# Patient Record
Sex: MALE | Race: WHITE | NOT HISPANIC OR LATINO | URBAN - METROPOLITAN AREA
[De-identification: names, ages, dates, MRNs, and addresses within clinical notes are randomized per-mention and may not be internally consistent; named-entity substitution may affect disease eponyms.]

---

## 2017-01-06 ENCOUNTER — OFFICE VISIT (OUTPATIENT)
Dept: INTERNAL MEDICINE | Facility: CLINIC | Age: 67
End: 2017-01-06
Payer: COMMERCIAL

## 2017-01-06 VITALS
TEMPERATURE: 98 F | WEIGHT: 193 LBS | BODY MASS INDEX: 25.58 KG/M2 | HEART RATE: 102 BPM | DIASTOLIC BLOOD PRESSURE: 100 MMHG | HEIGHT: 73 IN | SYSTOLIC BLOOD PRESSURE: 150 MMHG | OXYGEN SATURATION: 97 %

## 2017-01-06 DIAGNOSIS — R55 SYNCOPE, UNSPECIFIED SYNCOPE TYPE: ICD-10-CM

## 2017-01-06 DIAGNOSIS — I10 ESSENTIAL HYPERTENSION: Primary | ICD-10-CM

## 2017-01-06 DIAGNOSIS — I95.1 ORTHOSTATIC HYPOTENSION: ICD-10-CM

## 2017-01-06 PROCEDURE — 99214 OFFICE O/P EST MOD 30 MIN: CPT | Performed by: INTERNAL MEDICINE

## 2017-01-06 RX ORDER — AMLODIPINE BESYLATE 10 MG/1
5 TABLET ORAL 2 TIMES DAILY
COMMUNITY
Start: 2017-01-06 | End: 2017-02-07

## 2017-01-06 NOTE — MR AVS SNAPSHOT
"              After Visit Summary   1/6/2017    Josh Seth    MRN: [id number]           Patient Information     Date Of Birth          1950        Visit Information        Provider Department      1/6/2017 10:40 AM Alan Amezquita MD [facility]        Today's Diagnoses     Essential hypertension    -  1     Noncompliance with medication regimen         Orthostatic hypotension         Syncope, unspecified syncope type            Follow-ups after your visit        Future tests that were ordered for you today     Open Future Orders        Priority Expected Expires Ordered    Echocardiogram Complete Routine  1/6/2018 1/6/2017            Who to contact     If you have questions or need follow up information about today's clinic visit or your schedule please contact [facility] directly at [phone].  Normal or non-critical lab and imaging results will be communicated to you by MyChart, letter or phone within 4 business days after the clinic has received the results. If you do not hear from us within 7 days, please contact the clinic through MyChart or phone. If you have a critical or abnormal lab result, we will notify you by phone as soon as possible.  Submit refill requests through [employer] or call your pharmacy and they will forward the refill request to us. Please allow 3 business days for your refill to be completed.          Additional Information About Your Visit        Mingly[employer] Information     [employer] lets you send messages to your doctor, view your test results, renew your prescriptions, schedule appointments and more. To sign up, go to [email or web address] . Click on \"Log in\" on the left side of the screen, which will take you to the Welcome page. Then click on \"Sign up Now\" on the right side of the page.     You will be asked to enter the access code listed below, as well as some personal information. Please follow the directions to create " "your username and password.     Your access code is: [email or web address]  Expires: 2017 11:30 AM     Your access code will  in 90 days. If you need help or a new code, please call your [facility] or [phone].        Care EveryWhere ID     This is your Care EveryWhere ID. This could be used by other organizations to access your [place] medical records  [id number]        Your Vitals Were     Pulse Temperature Height BMI (Body Mass Index) Pulse Oximetry       102 98  F (36.7  C) (Oral) 6' 1\" (1.854 m) 25.47 kg/m2 97%        Blood Pressure from Last 3 Encounters:   17 150/100   16 142/97   16 158/98    Weight from Last 3 Encounters:   17 193 lb (87.544 kg)   16 195 lb (88.451 kg)   16 206 lb (93.441 kg)                 Today's Medication Changes          These changes are accurate as of: 17 11:30 AM.  If you have any questions, ask your nurse or doctor.               These medicines have changed or have updated prescriptions.        Dose/Directions    amLODIPine 10 MG tablet   Commonly known as:  NORVASC   This may have changed:    - how much to take  - when to take this  - Another medication with the same name was removed. Continue taking this medication, and follow the directions you see here.   Used for:  Essential hypertension   Changed by:  Alan Amezquita MD        Dose:  5 mg   Take 0.5 tablets (5 mg) by mouth 2 times daily   Refills:  0         Stop taking these medicines if you haven't already. Please contact your care team if you have questions.     hydrochlorothiazide 25 MG tablet   Commonly known as:  HYDRODIURIL   Stopped by:  Alan Amezquita MD           lisinopril 10 MG tablet   Commonly known as:  PRINIVIL/ZESTRIL   Stopped by:  Alan Amezquita MD           lisinopril 20 MG tablet   Commonly known as:  PRINIVIL/ZESTRIL   Stopped by:  Alan Amezquita MD                    Primary Care Provider Office Phone # Fax #    Abrahan Bonilla MD " [phone]       [facility] [address]  [facility] [address]        Thank you!     Thank you for choosing [facility]  for your care. Our goal is always to provide you with excellent care. Hearing back from our patients is one way we can continue to improve our services. Please take a few minutes to complete the written survey that you may receive in the mail after your visit with us. Thank you!             Your Updated Medication List - Protect others around you: Learn how to safely use, store and throw away your medicines at www.disposemymeds.org.          This list is accurate as of: 1/6/17 11:30 AM.  Always use your most recent med list.                   Brand Name Dispense Instructions for use    amLODIPine 10 MG tablet    NORVASC     Take 0.5 tablets (5 mg) by mouth 2 times daily       ASPIRIN NOT PRESCRIBED    INTENTIONAL    0    due to hx of pud       nicotine polacrilex 4 MG gum    NICORETTE     Place 4 mg inside cheek as needed for smoking cessation

## 2017-01-06 NOTE — PROGRESS NOTES
"  SUBJECTIVE:                                                    Josh Seth is a 66 year old male who presents to clinic today for the following health issues:    Pt has been seen sporatically over the last few yrs for his BP.  Mostly by his PCP Dr. Bonilla.   Each time he is found to not be taking his BP meds regularly.  Most recently he reports that he has been trying to figure out which meds he should be taking because he has 2 syncopal episodes in the last few months. He describes these as occuring w/getting up /change in position. He feels dizzy, knows he is about to lose consciousness and either does or almost does.  Because of this he has been changing his regimen on his own to see if his meds are causing this.  This incld taking meds every other day, switching the doses. Stopping some altogether.  Most recently he is only taking his amlodipine 5 bid and states most of his home # since have been in the 120/80-90 range.    When seen recently in [facility] - he was taking many QOD , now as above only on the amlodipine.  Labs done at that time showed moderate AST/ALT elev- he admits to \"heavy\" alcohol use earlier this year but now has only 1-2 per day.   Continues to smoke and notes a productive cough over the last 2 weeks with some post nasal drainage that is worse in the AM.      Amount of exercise or physical activity: None    Problems taking medications regularly: No    Medication side effects: lightheadedness when going to stand up  Diet: regular (no restrictions)    Problem list and histories reviewed & adjusted, as indicated.  Additional history: as documented    Problem list, Medication list, Allergies, and Medical/Social/Surgical histories reviewed in [facility] and updated as appropriate.    ROS:  Constitutional, HEENT, cardiovascular, pulmonary, gi and gu systems are negative, except as otherwise noted.    OBJECTIVE:                                                    /100 mmHg  Pulse 102  Temp(Src) " "98  F (36.7  C) (Oral)  Ht 6' 1\" (1.854 m)  Wt 193 lb (87.544 kg)  BMI 25.47 kg/m2  SpO2 97%  Body mass index is 25.47 kg/(m^2).  GENERAL APPEARANCE: alert, no distress and over weight  HENT: nose and mouth without ulcers or lesions  NECK: no adenopathy, no asymmetry, masses, or scars and thyroid normal to palpation  RESP: rhonchi. Diminished BS.    CV: regular rates and rhythm, normal S1 S2, no S3 or S4 and no murmur, click or rub  MS: extremities normal- no gross deformities noted  SKIN: no suspicious lesions or rashes    Diagnostic test results:  none      ASSESSMENT/PLAN:                                                    1. Essential hypertension  Poor control. Labile . i recommended a 24 bp monitor given he is convinced additional medication is not to his advantage.   He even reported at the end of appt that he was unsure if he took his AM med- so rec'd he get his BP rechecked at pharm or here in short term.  D/c alcohol or cut back drastically  Consider B-B for BP due to hx of vasovagal/orthostatic episodes.    - amLODIPine (NORVASC) 10 MG tablet; Take 0.5 tablets (5 mg) by mouth 2 times daily  - Echocardiogram Complete; Future    2. Orthostatic hypotension  3. Syncope, unspecified syncope type  Likely multi-factorial- what role- if any the BP meds play not sure.  Given off most - will continue with present med only , get the echo, recheck BP here after echo. rec'd drastic reduction in alcohol intake.  Consider event monitor if continues  - Echocardiogram Complete; Future    4. Elevated LFTs  Probably elated to alcohol use - but would rec'd repeat and w/u if continued elevations noted    Follow up with Provider - after echo     Alan Amezquita MD  [facility]    "

## 2017-01-06 NOTE — NURSING NOTE
"Chief Complaint   Patient presents with     Urgent Care     Hypertension       Initial /100 mmHg  Pulse 102  Temp(Src) 98  F (36.7  C) (Oral)  Ht 6' 1\" (1.854 m)  Wt 193 lb (87.544 kg)  BMI 25.47 kg/m2  SpO2 97% Estimated body mass index is 25.47 kg/(m^2) as calculated from the following:    Height as of this encounter: 6' 1\" (1.854 m).    Weight as of this encounter: 193 lb (87.544 kg).  BP completed using cuff size: nicolle Maynard CMA       "

## 2017-01-18 ENCOUNTER — TELEPHONE (OUTPATIENT)
Dept: INTERNAL MEDICINE | Facility: CLINIC | Age: 67
End: 2017-01-18

## 2017-01-18 ENCOUNTER — RADIANT APPOINTMENT (OUTPATIENT)
Dept: CARDIOLOGY | Facility: CLINIC | Age: 67
End: 2017-01-18
Attending: INTERNAL MEDICINE
Payer: COMMERCIAL

## 2017-01-18 DIAGNOSIS — I95.1 ORTHOSTATIC HYPOTENSION: ICD-10-CM

## 2017-01-18 DIAGNOSIS — R55 SYNCOPE, UNSPECIFIED SYNCOPE TYPE: ICD-10-CM

## 2017-01-18 DIAGNOSIS — I10 ESSENTIAL HYPERTENSION: ICD-10-CM

## 2017-01-18 PROCEDURE — 93306 TTE W/DOPPLER COMPLETE: CPT | Mod: TC

## 2017-01-18 PROCEDURE — 93306 TTE W/DOPPLER COMPLETE: CPT | Mod: 26 | Performed by: INTERNAL MEDICINE

## 2017-01-18 NOTE — TELEPHONE ENCOUNTER
Pt present at the clinic today with questions regarding his labs from 12/29/16. He was told that his WBC was elevated but at his last visit with MD he said that it was not rechecked. He is requesting WBC recheck and if MD will not order, why he doesn't he need this rechecked?

## 2017-01-18 NOTE — TELEPHONE ENCOUNTER
Patient advised of MD message and will be seeing Dr. Amezquita to follow up on echo results and discuss WBC.  ZACK Dao R.N.

## 2017-01-18 NOTE — TELEPHONE ENCOUNTER
I wouldn't recheck it for that at this time- it so slight an increase.   Don't sweat the little things and miss the bigger issue here- he needs to concentrate on taking his meds on a consistent basis and f/u with Dr. Bonilla or myself

## 2017-02-07 ENCOUNTER — OFFICE VISIT (OUTPATIENT)
Dept: INTERNAL MEDICINE | Facility: CLINIC | Age: 67
End: 2017-02-07
Payer: COMMERCIAL

## 2017-02-07 DIAGNOSIS — I10 ESSENTIAL HYPERTENSION: Primary | ICD-10-CM

## 2017-02-07 DIAGNOSIS — F17.200 TOBACCO USE DISORDER: ICD-10-CM

## 2017-02-07 DIAGNOSIS — R97.20 ELEVATED PROSTATE SPECIFIC ANTIGEN (PSA): ICD-10-CM

## 2017-02-07 DIAGNOSIS — I95.1 ORTHOSTATIC HYPOTENSION: ICD-10-CM

## 2017-02-07 DIAGNOSIS — K75.9 HEPATITIS: ICD-10-CM

## 2017-02-07 DIAGNOSIS — R71.8 ELEVATED MCV: ICD-10-CM

## 2017-02-07 PROBLEM — Z71.89 ADVANCED DIRECTIVES, COUNSELING/DISCUSSION: Status: ACTIVE | Noted: 2017-02-07

## 2017-02-07 LAB
ALBUMIN SERPL-MCNC: 3.9 G/DL (ref 3.4–5)
ALP SERPL-CCNC: 140 U/L (ref 40–150)
ALT SERPL W P-5'-P-CCNC: 193 U/L (ref 0–70)
ANION GAP SERPL CALCULATED.3IONS-SCNC: 9 MMOL/L (ref 3–14)
AST SERPL W P-5'-P-CCNC: 158 U/L (ref 0–45)
BILIRUB SERPL-MCNC: 0.9 MG/DL (ref 0.2–1.3)
BUN SERPL-MCNC: 16 MG/DL (ref 7–30)
CALCIUM SERPL-MCNC: 9.4 MG/DL (ref 8.5–10.1)
CHLORIDE SERPL-SCNC: 99 MMOL/L (ref 94–109)
CO2 SERPL-SCNC: 27 MMOL/L (ref 20–32)
CORTIS SERPL-MCNC: 25.4 UG/DL (ref 4–22)
CREAT SERPL-MCNC: 1.1 MG/DL (ref 0.66–1.25)
ERYTHROCYTE [DISTWIDTH] IN BLOOD BY AUTOMATED COUNT: 15.3 % (ref 10–15)
FOLATE SERPL-MCNC: 3.2 NG/ML
GFR SERPL CREATININE-BSD FRML MDRD: 67 ML/MIN/1.7M2
GLUCOSE SERPL-MCNC: 133 MG/DL (ref 70–99)
HCT VFR BLD AUTO: 38.8 % (ref 40–53)
HGB BLD-MCNC: 14 G/DL (ref 13.3–17.7)
MCH RBC QN AUTO: 37.5 PG (ref 26.5–33)
MCHC RBC AUTO-ENTMCNC: 36.1 G/DL (ref 31.5–36.5)
MCV RBC AUTO: 104 FL (ref 78–100)
PLATELET # BLD AUTO: 171 10E9/L (ref 150–450)
POTASSIUM SERPL-SCNC: 4.9 MMOL/L (ref 3.4–5.3)
PROT SERPL-MCNC: 7.4 G/DL (ref 6.8–8.8)
RBC # BLD AUTO: 3.73 10E12/L (ref 4.4–5.9)
SODIUM SERPL-SCNC: 135 MMOL/L (ref 133–144)
VIT B12 SERPL-MCNC: 942 PG/ML (ref 193–986)
WBC # BLD AUTO: 14.3 10E9/L (ref 4–11)

## 2017-02-07 PROCEDURE — 85027 COMPLETE CBC AUTOMATED: CPT | Performed by: INTERNAL MEDICINE

## 2017-02-07 PROCEDURE — 36415 COLL VENOUS BLD VENIPUNCTURE: CPT | Performed by: INTERNAL MEDICINE

## 2017-02-07 PROCEDURE — 84153 ASSAY OF PSA TOTAL: CPT | Mod: 90 | Performed by: INTERNAL MEDICINE

## 2017-02-07 PROCEDURE — 84165 PROTEIN E-PHORESIS SERUM: CPT | Performed by: INTERNAL MEDICINE

## 2017-02-07 PROCEDURE — 82746 ASSAY OF FOLIC ACID SERUM: CPT | Performed by: INTERNAL MEDICINE

## 2017-02-07 PROCEDURE — 00000402 ZZHCL STATISTIC TOTAL PROTEIN: Performed by: INTERNAL MEDICINE

## 2017-02-07 PROCEDURE — 82533 TOTAL CORTISOL: CPT | Performed by: INTERNAL MEDICINE

## 2017-02-07 PROCEDURE — 99000 SPECIMEN HANDLING OFFICE-LAB: CPT | Performed by: INTERNAL MEDICINE

## 2017-02-07 PROCEDURE — 80053 COMPREHEN METABOLIC PANEL: CPT | Performed by: INTERNAL MEDICINE

## 2017-02-07 PROCEDURE — 99214 OFFICE O/P EST MOD 30 MIN: CPT | Performed by: INTERNAL MEDICINE

## 2017-02-07 PROCEDURE — 84154 ASSAY OF PSA FREE: CPT | Mod: 90 | Performed by: INTERNAL MEDICINE

## 2017-02-07 PROCEDURE — 82607 VITAMIN B-12: CPT | Performed by: INTERNAL MEDICINE

## 2017-02-07 RX ORDER — LISINOPRIL 10 MG/1
TABLET ORAL
Qty: 60 TABLET | Refills: 11 | Status: SHIPPED | OUTPATIENT
Start: 2017-02-07 | End: 2017-07-21 | Stop reason: SINTOL

## 2017-02-07 NOTE — PROGRESS NOTES
SUBJECTIVE:                                                    Josh Seth is a 66 year old male who presents to clinic today for the following health issues:       Transfer of Care  Echo results    Pt's past medical history, family history, habits, medications and allergies were reviewed with the patient today.  See snap shot for  HCM status. Most recent lab results reviewed with pt. Problem list and histories reviewed & adjusted, as indicated.  Additional history as below:    Meeting patient for the first time today. Chart reviewed from visits with previous doctors. History of hypertension previously managed with lisinopril. Chart shows the patient was changed to Norvasc but the patient did not fill this and is currently taking lisinopril 20 mg tablet, one half tablet the morning, one third tablet daily afternoon and one half tablet in the evening. Patient describes symptoms of lightheadedness intermittently here today. Patient though wants a couple weeks ago when blood pressure was low. Denies head trauma or residual musculoskeletal symptoms since then. No loss of consciousness. No seizure activity. Patient is drinking alcohol daily. He describes having 2 glasses of vodka per day though the glasses each contain approximately 4-5 shots worth of vodka. Patient currently smoking. Denies medical difficulty with alcohol use. 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 Patient states staff has been cut dramatically in his apartment and he just having to do more more for his job which is stressful. Patient's mother passed away a year ago. Patient denies suicidal ideation. Admits to some depression symptoms but not interested in medication at this time. Denies others complain about his alcohol use. Denies current chest pain or shortness of breath. No abdominal pain. Review of the chart shows a previous PSA lab  1 iear ago which was elevated  "to 7.0. Patient had been referred to urology according to the chart prior to that lab result but no follow-up has been done. Patient did not see urology at that time. Denies current hematuria or dysuria. Occasional nocturia. Labs from late December from an urgent care visit show hepatitis. No follow-up done     Additional ROS:   Constitutional, HEENT, Cardiovascular, Pulmonary, GI and , Neuro, MSK and Psych review of systems/symptoms are otherwise negative or unchanged from previous, except as noted above.      OBJECTIVE:  /88 mmHg  Pulse 96  Temp(Src) 97.9  F (36.6  C) (Oral)  Wt 187 lb (84.823 kg)  SpO2 98%   Estimated body mass index is 24.68 kg/(m^2) as calculated from the following:    Height as of 1/6/17: 6' 1\" (1.854 m).    Weight as of this encounter: 187 lb (84.823 kg).     Pt has a 30 point drop in SBP with lying to standing position    Eye: PERRL, EOMI  HENT: ear canals and TM's normal and nose and mouth without ulcers or lesions   Neck: no adenopathy. Thyroid normal to palpation. No bruits  Pulm: Lungs clear to auscultation   CV: Regular rates and rhythm  GI: Soft, nontender, Normal active bowel sounds, No hepatosplenomegaly or masses palpable  Ext: Peripheral pulses intact. No edema.  Neuro: Normal strength and tone, sensory exam grossly normal. No asterixis  Rectal: prostate symmetric w/o nodularity, no masses palpated, stool guaiac negative and prostate 1+       Assessment/Plan: (See plan discussion below for further details)  1. Essential hypertension  Patient showing some orthostatic changes as above. See plan discussion below regarding dose adjustment along with reducing risk factors for orthostatic changes.   - lisinopril (PRINIVIL/ZESTRIL) 10 MG tablet; 1 tab twice a day  Dispense: 60 tablet; Refill: 11    2. Orthostatic hypotension  See plan discussion below  - Comprehensive metabolic panel  - CBC with platelets  - Cortisol    3. Hepatitis  - Comprehensive metabolic panel    4. " Elevated prostate specific antigen (PSA)  Needs lab recheck If worsens, will refer to Urology  - PSA, total and free    5. Elevated MCV  See plan discussion below regarding alcohol cessation. Labs as ordered  - Vitamin B12  - Protein electrophoresis  - Folate  - CBC with platelets    6. Tobacco use disorder  See plan discussion below    Plan discussion:  Labs as ordered   Reduce alcohol  to 1 drink for the next 2 days max. Then no alcohol  Change Lisinopril to 20mg tab, 1/2 tab twice a day or 10mg tab, 1 tab twice a day. Skip the  mid afternoon dose  Reduce cigarette use with goal of stopping completely in the next 2 weeks.  May use nicotine gum  Reduce tea and other caffeine consumption with goal of  going caffeine-free in the next 2 weeks  See me in 4 weeks for follow-up         José Manuel Demarco MD  Internal Medicine Department  [facility]

## 2017-02-07 NOTE — PATIENT INSTRUCTIONS
Labs as ordered   Reduce alcohol  To 1 drink for the next 2 days max. Then no alcohol  Change Lisinopril to 20mg tab, 1/2 tab twice a day or 10mg tab, 1 tab twice a day. Skip the  mid afternoon dose  Reduce cigarette use with goal of stopping completely in the next 2 weeks.  May use nicotine gum  Reduce tea and other caffeine consumption with goal of  going caffeine-free in the next 2 weeks  See me in 4 weeks for follow-up  Pt declines vaccinations

## 2017-02-07 NOTE — NURSING NOTE
"Chief Complaint   Patient presents with     Establish Care     Results     echo results       Initial /98 mmHg  Pulse 111  Temp(Src) 97.9  F (36.6  C) (Oral)  Wt 187 lb (84.823 kg)  SpO2 98% Estimated body mass index is 24.68 kg/(m^2) as calculated from the following:    Height as of 1/6/17: 6' 1\" (1.854 m).    Weight as of this encounter: 187 lb (84.823 kg).  Medication Reconciliation: complete    "

## 2017-02-07 NOTE — MR AVS SNAPSHOT
After Visit Summary   2/7/2017    Josh Seth    MRN: [id number]           Patient Information     Date Of Birth          1950        Visit Information        Provider Department      2/7/2017 8:30 AM José Manuel Demarco MD [facility]        Today's Diagnoses     Elevated prostate specific antigen (PSA)    -  1     Need for hepatitis C screening test         Tobacco use disorder         Need for prophylactic vaccination against Streptococcus pneumoniae (pneumococcus)         Orthostatic hypotension         Hepatitis         Essential hypertension         Elevated MCV           Care Instructions    Labs as ordered   Reduce alcohol  To 1 drink for the next 2 days max. Then no alcohol  Change Lisinopril to 20mg tab, 1/2 tab twice a day or 10mg tab, 1 tab twice a day. Skip the  mid afternoon dose  Reduce cigarette use with goal of stopping completely in the next 2 weeks.  May use nicotine gum  Reduce tea and other caffeine consumption with goal of  going caffeine-free in the next 2 weeks  See me in 4 weeks for follow-up  Pt declines vaccinations        Follow-ups after your visit        Who to contact     If you have questions or need follow up information about today's clinic visit or your schedule please contact [facility] directly at [phone].  Normal or non-critical lab and imaging results will be communicated to you by MyChart, letter or phone within 4 business days after the clinic has received the results. If you do not hear from us within 7 days, please contact the clinic through MyChart or phone. If you have a critical or abnormal lab result, we will notify you by phone as soon as possible.  Submit refill requests through [employer] or call your pharmacy and they will forward the refill request to us. Please allow 3 business days for your refill to be completed.          Additional Information About Your Visit        MyChart Information      "[employer] lets you send messages to your doctor, view your test results, renew your prescriptions, schedule appointments and more. To sign up, go to [email or web address] . Click on \"Log in\" on the left side of the screen, which will take you to the Welcome page. Then click on \"Sign up Now\" on the right side of the page.     You will be asked to enter the access code listed below, as well as some personal information. Please follow the directions to create your username and password.     Your access code is: [email or web address]  Expires: 2017 11:30 AM     Your access code will  in 90 days. If you need help or a new code, please call your [place] clinic or [phone].        Care EveryWhere ID     This is your Care EveryWhere ID. This could be used by other organizations to access your [place] medical records  [id number]        Your Vitals Were     Pulse Temperature Pulse Oximetry             111 97.9  F (36.6  C) (Oral) 98%          Blood Pressure from Last 3 Encounters:   17 150/88   17 150/100   16 142/97    Weight from Last 3 Encounters:   17 187 lb (84.823 kg)   17 193 lb (87.544 kg)   16 195 lb (88.451 kg)              We Performed the Following     CBC with platelets     Comprehensive metabolic panel     Cortisol     Folate     Protein electrophoresis     PSA, total and free     Vitamin B12          Today's Medication Changes          These changes are accurate as of: 17  9:30 AM.  If you have any questions, ask your nurse or doctor.               Start taking these medicines.        Dose/Directions    lisinopril 10 MG tablet   Commonly known as:  PRINIVIL/ZESTRIL   Used for:  Essential hypertension   Started by:  José Manuel Demarco MD        1 tab twice a day   Quantity:  60 tablet   Refills:  11         Stop taking these medicines if you haven't already. Please contact your care team if you have questions.     amLODIPine 10 MG tablet   Commonly known as:  NORVASC "   Stopped by:  José Manuel Demarco MD                Where to get your medicines      These medications were sent to [facility] [address] [place], [place] - [address]  [address] [facility] [address]     Phone:  [phone]    - lisinopril 10 MG tablet             Primary Care Provider Office Phone # Fax #    Abrahan Bonilla [phone]       [facility] [address]  [facility] [address]        Thank you!     Thank you for choosing [facility]  for your care. Our goal is always to provide you with excellent care. Hearing back from our patients is one way we can continue to improve our services. Please take a few minutes to complete the written survey that you may receive in the mail after your visit with us. Thank you!             Your Updated Medication List - Protect others around you: Learn how to safely use, store and throw away your medicines at www.disposemymeds.org.          This list is accurate as of: 2/7/17  9:30 AM.  Always use your most recent med list.                   Brand Name Dispense Instructions for use    ASPIRIN NOT PRESCRIBED    INTENTIONAL    0    due to hx of pud       lisinopril 10 MG tablet    PRINIVIL/ZESTRIL    60 tablet    1 tab twice a day       nicotine polacrilex 4 MG gum    NICORETTE     Place 4 mg inside cheek as needed for smoking cessation

## 2017-02-08 VITALS
TEMPERATURE: 97.9 F | SYSTOLIC BLOOD PRESSURE: 150 MMHG | WEIGHT: 187 LBS | DIASTOLIC BLOOD PRESSURE: 88 MMHG | HEART RATE: 96 BPM | OXYGEN SATURATION: 98 % | BODY MASS INDEX: 24.68 KG/M2

## 2017-02-08 LAB
ALBUMIN SERPL ELPH-MCNC: 4.5 G/DL (ref 3.7–5.1)
ALPHA1 GLOB SERPL ELPH-MCNC: 0.3 G/DL (ref 0.2–0.4)
ALPHA2 GLOB SERPL ELPH-MCNC: 0.7 G/DL (ref 0.5–0.9)
B-GLOBULIN SERPL ELPH-MCNC: 0.7 G/DL (ref 0.6–1)
GAMMA GLOB SERPL ELPH-MCNC: 0.9 G/DL (ref 0.7–1.6)
M PROTEIN SERPL ELPH-MCNC: 0.1 G/DL
PROT PATTERN SERPL ELPH-IMP: ABNORMAL
PSA FREE MFR SERPL: 21 %
PSA FREE SERPL-MCNC: 2.5 NG/ML
PSA SERPL-MCNC: 11.7 NG/ML

## 2017-02-15 ENCOUNTER — TELEPHONE (OUTPATIENT)
Dept: INTERNAL MEDICINE | Facility: CLINIC | Age: 67
End: 2017-02-15

## 2017-02-15 DIAGNOSIS — K75.9 HEPATITIS: ICD-10-CM

## 2017-02-15 DIAGNOSIS — D47.2 MGUS (MONOCLONAL GAMMOPATHY OF UNKNOWN SIGNIFICANCE): ICD-10-CM

## 2017-02-15 DIAGNOSIS — R97.20 ELEVATED PROSTATE SPECIFIC ANTIGEN (PSA): Primary | ICD-10-CM

## 2017-02-15 DIAGNOSIS — D72.829 LEUKOCYTOSIS, UNSPECIFIED TYPE: ICD-10-CM

## 2017-02-15 NOTE — TELEPHONE ENCOUNTER
Reason for Call:  Request for results:    Name of test or procedure: Lab     Date of test of procedure: 2/7/17    Location of the test or procedure: [facility]    OK to leave the result message on voice mail or with a family member? YES    Phone number Patient can be reached at:  Home number on file [phone] (home)    Additional comments: patient would like a copy of his lab results sent to him.    Call taken on 2/15/2017 at 4:16 PM by EVANS JAMES

## 2017-02-20 PROBLEM — D47.2 MGUS (MONOCLONAL GAMMOPATHY OF UNKNOWN SIGNIFICANCE): Status: ACTIVE | Noted: 2017-02-20

## 2017-02-21 NOTE — TELEPHONE ENCOUNTER
Spoke with pt and reviewed. PSA worsened  Compared to 1 year ago when checked by Dr Bonilla. Pt did not see Urology then as recommended and ordered. Pt to see [facility], KALE Michel [phone] for eval of PSA and probable prostate. Pt given tele number but will ask my nurse to speak with Urology clinic tomorrow to be sure that they can get pt in to be seen in the next couple weeks. Pt now drinking 1/2 previous ETOH. Encouraged him to reduce to 1/2 for the next 2-3 days and then STOP ALL ETOH use and have NF lab 1 week and see me in 2 week for f/u. Labs ordered. Reviewed other lab results with pt

## 2017-02-21 NOTE — TELEPHONE ENCOUNTER
Referral faxed to [facility] asking that they call  patient and schedule to be seen in the next 1-2 weeks.  If unable to do so, to please call us back.

## 2017-03-03 ENCOUNTER — OFFICE VISIT (OUTPATIENT)
Dept: UROLOGY | Facility: CLINIC | Age: 67
End: 2017-03-03
Payer: COMMERCIAL

## 2017-03-03 VITALS
HEART RATE: 88 BPM | OXYGEN SATURATION: 98 % | SYSTOLIC BLOOD PRESSURE: 160 MMHG | DIASTOLIC BLOOD PRESSURE: 88 MMHG | HEIGHT: 73 IN | WEIGHT: 190 LBS | BODY MASS INDEX: 25.18 KG/M2

## 2017-03-03 DIAGNOSIS — R97.20 ELEVATED PROSTATE SPECIFIC ANTIGEN (PSA): Primary | ICD-10-CM

## 2017-03-03 DIAGNOSIS — R35.0 URINARY FREQUENCY: ICD-10-CM

## 2017-03-03 LAB
ALBUMIN UR-MCNC: ABNORMAL MG/DL
APPEARANCE UR: CLEAR
BILIRUB UR QL STRIP: NEGATIVE
COLOR UR AUTO: YELLOW
GLUCOSE UR STRIP-MCNC: NEGATIVE MG/DL
HGB UR QL STRIP: NEGATIVE
HYALINE CASTS #/AREA URNS LPF: 34 /LPF (ref 0–2)
KETONES UR STRIP-MCNC: NEGATIVE MG/DL
LEUKOCYTE ESTERASE UR QL STRIP: ABNORMAL
MUCOUS THREADS #/AREA URNS LPF: PRESENT /LPF
NITRATE UR QL: NEGATIVE
PH UR STRIP: 5.5 PH (ref 5–7)
PSA SERPL-MCNC: 2.5 NG/ML (ref 0–4)
RBC #/AREA URNS AUTO: 0 /HPF (ref 0–2)
SP GR UR STRIP: 1.02 (ref 1–1.03)
SQUAMOUS #/AREA URNS AUTO: <1 /HPF (ref 0–1)
URN SPEC COLLECT METH UR: ABNORMAL
UROBILINOGEN UR STRIP-ACNC: 1 EU/DL (ref 0.2–1)
WBC #/AREA URNS AUTO: 2 /HPF (ref 0–2)

## 2017-03-03 PROCEDURE — 81001 URINALYSIS AUTO W/SCOPE: CPT | Performed by: UROLOGY

## 2017-03-03 PROCEDURE — 87086 URINE CULTURE/COLONY COUNT: CPT | Performed by: UROLOGY

## 2017-03-03 PROCEDURE — 99214 OFFICE O/P EST MOD 30 MIN: CPT | Performed by: UROLOGY

## 2017-03-03 PROCEDURE — 84153 ASSAY OF PSA TOTAL: CPT | Performed by: UROLOGY

## 2017-03-03 PROCEDURE — 88112 CYTOPATH CELL ENHANCE TECH: CPT | Performed by: UROLOGY

## 2017-03-03 PROCEDURE — 36415 COLL VENOUS BLD VENIPUNCTURE: CPT | Performed by: UROLOGY

## 2017-03-03 RX ORDER — ALFUZOSIN HYDROCHLORIDE 10 MG/1
10 TABLET, EXTENDED RELEASE ORAL DAILY
Qty: 30 TABLET | Refills: 3 | Status: SHIPPED | OUTPATIENT
Start: 2017-03-03

## 2017-03-03 ASSESSMENT — PAIN SCALES - GENERAL: PAINLEVEL: NO PAIN (0)

## 2017-03-03 NOTE — MR AVS SNAPSHOT
After Visit Summary   3/3/2017    Josh Seth    MRN: [id number]           Patient Information     Date Of Birth          1950        Visit Information        Provider Department      3/3/2017 1:00 PM Dolores Ohara MD [facility] [place]        Today's Diagnoses     Elevated prostate specific antigen (PSA)    -  1    Urinary frequency           Follow-ups after your visit        Follow-up notes from your care team     Return in about 6 weeks (around 4/14/2017) for uroflow/PVR .      Your next 10 appointments already scheduled     Apr 13, 2017 11:00 AM CDT   Return Visit with Dolores Ohara MD   [facility] [place])    [address]  [address]  [facility] [address] [phone]              Future tests that were ordered for you today     Open Future Orders        Priority Expected Expires Ordered    UA without Microscopic Routine  3/3/2018 3/3/2017            Who to contact     If you have questions or need follow up information about today's clinic visit or your schedule please contact [facility] directly at [phone].  Normal or non-critical lab and imaging results will be communicated to you by MyChart, letter or phone within 4 business days after the clinic has received the results. If you do not hear from us within 7 days, please contact the clinic through [employer] or phone. If you have a critical or abnormal lab result, we will notify you by phone as soon as possible.  Submit refill requests through [employer] or call your pharmacy and they will forward the refill request to us. Please allow 3 business days for your refill to be completed.          Additional Information About Your Visit        MyChart Information     [employer] lets you send messages to your doctor, view your test results, renew your prescriptions, schedule  "appointments and more. To sign up, go to [email or web address] . Click on \"Log in\" on the left side of the screen, which will take you to the Welcome page. Then click on \"Sign up Now\" on the right side of the page.     You will be asked to enter the access code listed below, as well as some personal information. Please follow the directions to create your username and password.     Your access code is: [email or web address]  Expires: 2017 11:30 AM     Your access code will  in 90 days. If you need help or a new code, please call your [place] clinic or [phone].        Care EveryWhere ID     This is your Care EveryWhere ID. This could be used by other organizations to access your [place] medical records  [id number]        Your Vitals Were     Pulse Height Pulse Oximetry BMI (Body Mass Index)          88 1.854 m (6' 1\") 98% 25.07 kg/m2         Blood Pressure from Last 3 Encounters:   17 160/88   17 150/88   17 (!) 150/100    Weight from Last 3 Encounters:   17 86.2 kg (190 lb)   17 84.8 kg (187 lb)   17 87.5 kg (193 lb)              We Performed the Following     Cytology non gyn [YFH3784]     PSA Diag Urologic Phys     Urine Culture Aerobic Bacterial [[id number]]     Urine Micro Urologic Phys [[id number]]          Today's Medication Changes          These changes are accurate as of: 3/3/17  2:06 PM.  If you have any questions, ask your nurse or doctor.               Start taking these medicines.        Dose/Directions    alfuzosin 10 MG 24 hr tablet   Commonly known as:  UROXATRAL   Used for:  Urinary frequency   Started by:  Dolores Ohara MD        Dose:  10 mg   Take 1 tablet (10 mg) by mouth daily   Quantity:  30 tablet   Refills:  3            Where to get your medicines      These medications were sent to [facility] [address] [place] [id number]     Phone:  [phone]     alfuzosin 10 MG " 24 hr tablet                Primary Care Provider Office Phone # Fax #    José Manuel Demarco [phone]       [facility] [address] [facility] [address]        Thank you!     Thank you for choosing [facility] [place]  for your care. Our goal is always to provide you with excellent care. Hearing back from our patients is one way we can continue to improve our services. Please take a few minutes to complete the written survey that you may receive in the mail after your visit with us. Thank you!             Your Updated Medication List - Protect others around you: Learn how to safely use, store and throw away your medicines at www.disposemymeds.org.          This list is accurate as of: 3/3/17  2:06 PM.  Always use your most recent med list.                   Brand Name Dispense Instructions for use    alfuzosin 10 MG 24 hr tablet    UROXATRAL    30 tablet    Take 1 tablet (10 mg) by mouth daily       lisinopril 10 MG tablet    PRINIVIL/ZESTRIL    60 tablet    1 tab twice a day

## 2017-03-03 NOTE — LETTER
3/3/2017       RE: Josh Seth  [address]  [facility] [address]     Dear Colleague,    Thank you for referring your patient, Josh Seth, to the [facility] [place] at [facility]. Please see a copy of my visit note below.    It was my pleasure to see Josh Seth a 66 year old year old male today. Patient was seen in consultation for elevated PSA.     HPI:   Patient presents for evaluation and management of elevated PSA.   PSA trend:  03/2016: 7.00  02/2017: 11.7    Repeat PSA today was 2.5.     We discussed PSA trend. Also discussed patient's previous microhematuria noted one year ago.       Patient also notes some lower urinary tract symptoms including urgency.   Past Medical History   Diagnosis Date     Acute duodenal ulcer without mention of hemorrhage, perforation, or obstruction      Allergic rhinitis, cause unspecified      MGUS (monoclonal gammopathy of unknown significance) 2/20/2017     Mumps      PATIENT IS NOT SURE     Other and unspecified hyperlipidemia      Prostatitis, unspecified      Sprain of neck      Tobacco use disorder      Unspecified essential hypertension      Unspecified sinusitis (chronic)        Past Surgical History   Procedure Laterality Date     C nonspecific procedure       eye injury (R)  OD op     C nonspecific procedure       nose operation       Family History   Problem Relation Age of Onset     DIABETES Mother      b-1926     Cardiovascular Mother      quintuple bypass       Current Outpatient Prescriptions   Medication Sig Dispense Refill     alfuzosin (UROXATRAL) 10 MG 24 hr tablet Take 1 tablet (10 mg) by mouth daily 30 tablet 3     lisinopril (PRINIVIL/ZESTRIL) 10 MG tablet 1 tab twice a day 60 tablet 11       ALLERGIES: Codeine; Erythromycin; and Sulfa drugs      REVIEW OF SYSTEMS:  Skin: negative  Eyes: negative  Ears/Nose/Throat: negative  Respiratory: No shortness of breath, dyspnea  "on exertion, cough, or hemoptysis  Cardiovascular: negative  Gastrointestinal: negative  Genitourinary: as above  Musculoskeletal: negative  Neurologic: negative  Psychiatric: negative  Hematologic/Lymphatic/Immunologic: negative  Endocrine: negative      GENERAL PHYSICAL EXAM:   Vitals: /88 (BP Location: Left arm, Patient Position: Chair, Cuff Size: Adult Regular)  Pulse 88  Ht 1.854 m (6' 1\")  Wt 86.2 kg (190 lb)  SpO2 98%  BMI 25.07 kg/m2  Body mass index is 25.07 kg/(m^2).  Constitutional: healthy, alert and no distress  Head: Normocephalic  Neck: Neck supple  Cardiovascular: negative  Respiratory: negative  Gastrointestinal: Abdomen soft, non-tender  Musculoskeletal: extremities normal-  Skin: no suspicious lesions or rashes  Neurologic: Gait normal.  Psychiatric: affect normal/bright and mentation appears normal.       EXAM:   Left Flank: negative  Right Flank: negative  Inguinal Area: normal      RECTAL EXAM:   Size: 1_, no nodules   Sphincter tone: normal  Tenderness: Absent  Rectal Mass: Absent  Prostate Nodule: Absent    Small black nevus on right buttock          RADIOLOGY: The following tests were reviewed: Need to complete the following Radiology exams prior to the office appointment:  LABS: The last test results for Needs to complete the necessary tests prior to appointment: were reviewed.     ASSESSMENT: 67 y/o M with fluctuating PSA     PLAN:   Will start trial of alfuzosin   Will repeat PSA in three months   U/a, urine culture, urine cytology today   Follow up in 6-8 weeks       I spent over 30 minutes with the patient.  Over half this time was spent on counseling for elevated PSA and lower urinary tract symptoms.      Again, thank you for allowing me to participate in the care of your patient.      Sincerely,    Dolores Ohara MD      "

## 2017-03-03 NOTE — PROGRESS NOTES
It was my pleasure to see Josh Seth a 66 year old year old male today. Patient was seen in consultation for elevated PSA.     HPI:   Patient presents for evaluation and management of elevated PSA.   PSA trend:  03/2016: 7.00  02/2017: 11.7    Repeat PSA today was 2.5.     We discussed PSA trend. Also discussed patient's previous microhematuria noted one year ago.       Patient also notes some lower urinary tract symptoms including urgency.   Past Medical History   Diagnosis Date     Acute duodenal ulcer without mention of hemorrhage, perforation, or obstruction      Allergic rhinitis, cause unspecified      MGUS (monoclonal gammopathy of unknown significance) 2/20/2017     Mumps      PATIENT IS NOT SURE     Other and unspecified hyperlipidemia      Prostatitis, unspecified      Sprain of neck      Tobacco use disorder      Unspecified essential hypertension      Unspecified sinusitis (chronic)        Past Surgical History   Procedure Laterality Date     C nonspecific procedure       eye injury (R)  OD op     C nonspecific procedure       nose operation       Family History   Problem Relation Age of Onset     DIABETES Mother      b-1926     Cardiovascular Mother      quintuple bypass       Current Outpatient Prescriptions   Medication Sig Dispense Refill     alfuzosin (UROXATRAL) 10 MG 24 hr tablet Take 1 tablet (10 mg) by mouth daily 30 tablet 3     lisinopril (PRINIVIL/ZESTRIL) 10 MG tablet 1 tab twice a day 60 tablet 11       ALLERGIES: Codeine; Erythromycin; and Sulfa drugs      REVIEW OF SYSTEMS:  Skin: negative  Eyes: negative  Ears/Nose/Throat: negative  Respiratory: No shortness of breath, dyspnea on exertion, cough, or hemoptysis  Cardiovascular: negative  Gastrointestinal: negative  Genitourinary: as above  Musculoskeletal: negative  Neurologic: negative  Psychiatric: negative  Hematologic/Lymphatic/Immunologic: negative  Endocrine: negative      GENERAL PHYSICAL EXAM:   Vitals: /88 (BP  "Location: Left arm, Patient Position: Chair, Cuff Size: Adult Regular)  Pulse 88  Ht 1.854 m (6' 1\")  Wt 86.2 kg (190 lb)  SpO2 98%  BMI 25.07 kg/m2  Body mass index is 25.07 kg/(m^2).  Constitutional: healthy, alert and no distress  Head: Normocephalic  Neck: Neck supple  Cardiovascular: negative  Respiratory: negative  Gastrointestinal: Abdomen soft, non-tender  Musculoskeletal: extremities normal-  Skin: no suspicious lesions or rashes  Neurologic: Gait normal.  Psychiatric: affect normal/bright and mentation appears normal.       EXAM:   Left Flank: negative  Right Flank: negative  Inguinal Area: normal      RECTAL EXAM:   Size: 1_, no nodules   Sphincter tone: normal  Tenderness: Absent  Rectal Mass: Absent  Prostate Nodule: Absent    Small black nevus on right buttock          RADIOLOGY: The following tests were reviewed: Need to complete the following Radiology exams prior to the office appointment:  LABS: The last test results for Needs to complete the necessary tests prior to appointment: were reviewed.     ASSESSMENT: 67 y/o M with fluctuating PSA     PLAN:   Will start trial of alfuzosin   Will repeat PSA in three months   U/a, urine culture, urine cytology today   Follow up in 6-8 weeks       I spent over 30 minutes with the patient.  Over half this time was spent on counseling for elevated PSA and lower urinary tract symptoms.    "

## 2017-03-04 LAB
BACTERIA SPEC CULT: NO GROWTH
Lab: NORMAL
MICRO REPORT STATUS: NORMAL
SPECIMEN SOURCE: NORMAL

## 2017-03-06 LAB — COPATH REPORT: NORMAL

## 2017-04-18 DIAGNOSIS — R39.15 URGENCY OF URINATION: Primary | ICD-10-CM

## 2017-04-20 ENCOUNTER — OFFICE VISIT (OUTPATIENT)
Dept: UROLOGY | Facility: CLINIC | Age: 67
End: 2017-04-20
Payer: COMMERCIAL

## 2017-04-20 VITALS
HEART RATE: 86 BPM | DIASTOLIC BLOOD PRESSURE: 70 MMHG | SYSTOLIC BLOOD PRESSURE: 138 MMHG | BODY MASS INDEX: 24.6 KG/M2 | HEIGHT: 73 IN | WEIGHT: 185.6 LBS | OXYGEN SATURATION: 98 %

## 2017-04-20 DIAGNOSIS — R39.9 LOWER URINARY TRACT SYMPTOMS (LUTS): Primary | ICD-10-CM

## 2017-04-20 PROCEDURE — 51798 US URINE CAPACITY MEASURE: CPT | Performed by: UROLOGY

## 2017-04-20 PROCEDURE — 99213 OFFICE O/P EST LOW 20 MIN: CPT | Mod: 25 | Performed by: UROLOGY

## 2017-04-20 ASSESSMENT — PAIN SCALES - GENERAL: PAINLEVEL: NO PAIN (0)

## 2017-04-20 NOTE — PROGRESS NOTES
Office Visit Note      UROLOGIC DIAGNOSES:       CURRENT INTERVENTIONS:       HISTORY:     Patient presents for evaluation and management of elevated PSA.   PSA trend:  03/2016: 7.00  02/2017: 11.7  03/2017 2.5    Patient with urgency and slowed stream. Noted improvement in stream one evening after several beers. Has noted several similar episodes. Some frequency noted during these episodes however.       Has not started alfuzosin yet. Therefore difficult to assess symptoms.            PAST MEDICAL HISTORY:   Past Medical History:   Diagnosis Date     Acute duodenal ulcer without mention of hemorrhage, perforation, or obstruction      Allergic rhinitis, cause unspecified      MGUS (monoclonal gammopathy of unknown significance) 2/20/2017     Mumps     PATIENT IS NOT SURE     Other and unspecified hyperlipidemia      Prostatitis, unspecified      Sprain of neck      Tobacco use disorder      Unspecified essential hypertension      Unspecified sinusitis (chronic)        PAST SURGICAL HISTORY:   Past Surgical History:   Procedure Laterality Date     C NONSPECIFIC PROCEDURE      eye injury (R)  OD op     C NONSPECIFIC PROCEDURE      nose operation       FAMILY HISTORY:   Family History   Problem Relation Age of Onset     DIABETES Mother      b-1926     Cardiovascular Mother      quintuple bypass       SOCIAL HISTORY:   Social History   Substance Use Topics     Smoking status: Current Every Day Smoker     Packs/day: 0.50     Years: 35.00     Types: Cigarettes     Smokeless tobacco: Never Used      Comment: 2-3 cigarettes daily     Alcohol use 1.2 oz/week     2 Standard drinks or equivalent per week      Comment: 2 glasses daily       Current Outpatient Prescriptions   Medication     lisinopril (PRINIVIL/ZESTRIL) 10 MG tablet     alfuzosin (UROXATRAL) 10 MG 24 hr tablet     No current facility-administered medications for this visit.          PHYSICAL EXAM:    BP (!) 160/100 (BP Location: Left arm, Patient Position:  "Chair, Cuff Size: Adult Regular)  Pulse 86  Ht 1.854 m (6' 1\")  Wt 84.2 kg (185 lb 9.6 oz)  SpO2 98%  BMI 24.49 kg/m2    HEENT: Normocephalic and atraumatic   Cardiac: Not done  Back/Flank: Not done  CNS/PNS: Not done  Respiratory: Normal non-labored breathing  Abdomen: Soft nontender and nondistended  Peripheral Vascular: Not done  Mental Status: Not done    Penis: Not done  Scrotal Skin: Not done  Testicles: Not done  Epididymis: Not done  Digital Rectal Exam:     Cystoscopy: Not done    Imaging: None    Urinalysis: UA RESULTS:  Recent Labs   Lab Test  03/03/17   1408  03/03/17   1401   COLOR  Yellow   --    APPEARANCE  Clear   --    URINEGLC  Negative   --    URINEBILI  Negative   --    URINEKETONE  Negative   --    SG  1.020   --    UBLD  Negative   --    URINEPH  5.5   --    PROTEIN  Trace*   --    UROBILINOGEN  1.0   --    NITRITE  Negative   --    LEUKEST  Trace*   --    RBCU   --   0   WBCU   --   2       PSA:     Post Void Residual:     Other labs: None today      IMPRESSION:  65 y/o M with lower urinary tract symptoms and fluctuating PSA     PLAN:  Start alfuzosin   Repeat PSA in 06/2017  Follow up in 06/2017    Total Time: 15 minutes                                    Total in Consultation: greater than 50%     Discussed lower urinary tract symptoms, PSA           "

## 2017-04-20 NOTE — LETTER
4/20/2017       RE: Josh Seht  [address]  [facility] [address]     Dear Colleague,    Thank you for referring your patient, Josh Seth, to the [facility] [place] at [facility]. Please see a copy of my visit note below.    Office Visit Note    UROLOGIC DIAGNOSES:     CURRENT INTERVENTIONS:     HISTORY:     Patient presents for evaluation and management of elevated PSA.   PSA trend:  03/2016: 7.00  02/2017: 11.7  03/2017 2.5    Patient with urgency and slowed stream. Noted improvement in stream one evening after several beers. Has noted several similar episodes. Some frequency noted during these episodes however.     Has not started alfuzosin yet. Therefore difficult to assess symptoms.     PAST MEDICAL HISTORY:   Past Medical History:   Diagnosis Date     Acute duodenal ulcer without mention of hemorrhage, perforation, or obstruction      Allergic rhinitis, cause unspecified      MGUS (monoclonal gammopathy of unknown significance) 2/20/2017     Mumps     PATIENT IS NOT SURE     Other and unspecified hyperlipidemia      Prostatitis, unspecified      Sprain of neck      Tobacco use disorder      Unspecified essential hypertension      Unspecified sinusitis (chronic)        PAST SURGICAL HISTORY:   Past Surgical History:   Procedure Laterality Date     C NONSPECIFIC PROCEDURE      eye injury (R)  OD op     C NONSPECIFIC PROCEDURE      nose operation       FAMILY HISTORY:   Family History   Problem Relation Age of Onset     DIABETES Mother      b-1926     Cardiovascular Mother      quintuple bypass       SOCIAL HISTORY:   Social History   Substance Use Topics     Smoking status: Current Every Day Smoker     Packs/day: 0.50     Years: 35.00     Types: Cigarettes     Smokeless tobacco: Never Used      Comment: 2-3 cigarettes daily     Alcohol use 1.2 oz/week     2 Standard drinks or equivalent per week      Comment: 2 glasses daily  "      Current Outpatient Prescriptions   Medication     lisinopril (PRINIVIL/ZESTRIL) 10 MG tablet     alfuzosin (UROXATRAL) 10 MG 24 hr tablet     No current facility-administered medications for this visit.          PHYSICAL EXAM:    BP (!) 160/100 (BP Location: Left arm, Patient Position: Chair, Cuff Size: Adult Regular)  Pulse 86  Ht 1.854 m (6' 1\")  Wt 84.2 kg (185 lb 9.6 oz)  SpO2 98%  BMI 24.49 kg/m2    HEENT: Normocephalic and atraumatic   Cardiac: Not done  Back/Flank: Not done  CNS/PNS: Not done  Respiratory: Normal non-labored breathing  Abdomen: Soft nontender and nondistended  Peripheral Vascular: Not done  Mental Status: Not done    Penis: Not done  Scrotal Skin: Not done  Testicles: Not done  Epididymis: Not done  Digital Rectal Exam:     Cystoscopy: Not done    Imaging: None    Urinalysis: UA RESULTS:  Recent Labs   Lab Test  03/03/17   1408  03/03/17   1401   COLOR  Yellow   --    APPEARANCE  Clear   --    URINEGLC  Negative   --    URINEBILI  Negative   --    URINEKETONE  Negative   --    SG  1.020   --    UBLD  Negative   --    URINEPH  5.5   --    PROTEIN  Trace*   --    UROBILINOGEN  1.0   --    NITRITE  Negative   --    LEUKEST  Trace*   --    RBCU   --   0   WBCU   --   2       PSA:     Post Void Residual:     Other labs: None today      IMPRESSION:  67 y/o M with lower urinary tract symptoms and fluctuating PSA     PLAN:  Start alfuzosin   Repeat PSA in 06/2017  Follow up in 06/2017    Total Time: 15 minutes                                    Total in Consultation: greater than 50%     Discussed lower urinary tract symptoms, PSA     Again, thank you for allowing me to participate in the care of your patient.      Sincerely,    Dolores Ohara MD      "

## 2017-04-20 NOTE — MR AVS SNAPSHOT
"              After Visit Summary   2017    Josh Seth    MRN: [id number]           Patient Information     Date Of Birth          1950        Visit Information        Provider Department      2017 11:00 AM Dolores Ohara MD [facility] [place]        Today's Diagnoses     Lower urinary tract symptoms (LUTS)    -  1       Follow-ups after your visit        Who to contact     If you have questions or need follow up information about today's clinic visit or your schedule please contact [facility] [place] directly at [phone].  Normal or non-critical lab and imaging results will be communicated to you by [employer], letter or phone within 4 business days after the clinic has received the results. If you do not hear from us within 7 days, please contact the clinic through [employer] or phone. If you have a critical or abnormal lab result, we will notify you by phone as soon as possible.  Submit refill requests through [employer] or call your pharmacy and they will forward the refill request to us. Please allow 3 business days for your refill to be completed.          Additional Information About Your Visit        MyChart Information     [employer] lets you send messages to your doctor, view your test results, renew your prescriptions, schedule appointments and more. To sign up, go to [email or web address] . Click on \"Log in\" on the left side of the screen, which will take you to the Welcome page. Then click on \"Sign up Now\" on the right side of the page.     You will be asked to enter the access code listed below, as well as some personal information. Please follow the directions to create your username and password.     Your access code is: [email or web address]  Expires: 2017  5:58 PM     Your access code will  in 90 days. If you need help or a new code, please call your [place] clinic or [phone].        Care EveryWhere " "ID     This is your Care EveryWhere ID. This could be used by other organizations to access your [place] medical records  [id number]        Your Vitals Were     Pulse Height Pulse Oximetry BMI (Body Mass Index)          86 1.854 m (6' 1\") 98% 24.49 kg/m2         Blood Pressure from Last 3 Encounters:   04/20/17 138/70   03/03/17 160/88   02/07/17 150/88    Weight from Last 3 Encounters:   04/20/17 84.2 kg (185 lb 9.6 oz)   03/03/17 86.2 kg (190 lb)   02/07/17 84.8 kg (187 lb)              We Performed the Following     MEASURE POST-VOID RESIDUAL URINE/BLADDER CAPACITY, US NON-IMAGING ([id number])        Primary Care Provider    None Specified       No primary provider on file.        Thank you!     Thank you for choosing [facility] [place]  for your care. Our goal is always to provide you with excellent care. Hearing back from our patients is one way we can continue to improve our services. Please take a few minutes to complete the written survey that you may receive in the mail after your visit with us. Thank you!             Your Updated Medication List - Protect others around you: Learn how to safely use, store and throw away your medicines at www.disposemymeds.org.          This list is accurate as of: 4/20/17  5:58 PM.  Always use your most recent med list.                   Brand Name Dispense Instructions for use    alfuzosin 10 MG 24 hr tablet    UROXATRAL    30 tablet    Take 1 tablet (10 mg) by mouth daily       lisinopril 10 MG tablet    PRINIVIL/ZESTRIL    60 tablet    1 tab twice a day         "

## 2017-06-27 ENCOUNTER — OFFICE VISIT (OUTPATIENT)
Dept: URGENT CARE | Facility: URGENT CARE | Age: 67
End: 2017-06-27
Payer: COMMERCIAL

## 2017-06-27 VITALS
WEIGHT: 174 LBS | HEART RATE: 116 BPM | BODY MASS INDEX: 22.96 KG/M2 | TEMPERATURE: 98 F | DIASTOLIC BLOOD PRESSURE: 100 MMHG | SYSTOLIC BLOOD PRESSURE: 160 MMHG | OXYGEN SATURATION: 98 %

## 2017-06-27 DIAGNOSIS — I10 ESSENTIAL HYPERTENSION: ICD-10-CM

## 2017-06-27 DIAGNOSIS — R71.8 ELEVATED MCV: ICD-10-CM

## 2017-06-27 DIAGNOSIS — K75.9 HEPATITIS: ICD-10-CM

## 2017-06-27 DIAGNOSIS — R26.81 GENERAL UNSTEADINESS: Primary | ICD-10-CM

## 2017-06-27 LAB
ALBUMIN SERPL-MCNC: 3.7 G/DL (ref 3.4–5)
ALP SERPL-CCNC: 155 U/L (ref 40–150)
ALT SERPL W P-5'-P-CCNC: 145 U/L (ref 0–70)
AMYLASE SERPL-CCNC: 46 U/L (ref 30–110)
ANION GAP SERPL CALCULATED.3IONS-SCNC: 8 MMOL/L (ref 3–14)
AST SERPL W P-5'-P-CCNC: 100 U/L (ref 0–45)
BASOPHILS # BLD AUTO: 0.1 10E9/L (ref 0–0.2)
BASOPHILS NFR BLD AUTO: 0.5 %
BILIRUB SERPL-MCNC: 0.7 MG/DL (ref 0.2–1.3)
BUN SERPL-MCNC: 6 MG/DL (ref 7–30)
CALCIUM SERPL-MCNC: 9.9 MG/DL (ref 8.5–10.1)
CHLORIDE SERPL-SCNC: 107 MMOL/L (ref 94–109)
CO2 SERPL-SCNC: 26 MMOL/L (ref 20–32)
CREAT SERPL-MCNC: 0.95 MG/DL (ref 0.66–1.25)
DIFFERENTIAL METHOD BLD: ABNORMAL
EOSINOPHIL # BLD AUTO: 0 10E9/L (ref 0–0.7)
EOSINOPHIL NFR BLD AUTO: 0.1 %
ERYTHROCYTE [DISTWIDTH] IN BLOOD BY AUTOMATED COUNT: 15.1 % (ref 10–15)
GFR SERPL CREATININE-BSD FRML MDRD: 80 ML/MIN/1.7M2
GLUCOSE SERPL-MCNC: 127 MG/DL (ref 70–99)
HCT VFR BLD AUTO: 42.4 % (ref 40–53)
HGB BLD-MCNC: 15 G/DL (ref 13.3–17.7)
LIPASE SERPL-CCNC: 138 U/L (ref 73–393)
LYMPHOCYTES # BLD AUTO: 0.8 10E9/L (ref 0.8–5.3)
LYMPHOCYTES NFR BLD AUTO: 7.7 %
MCH RBC QN AUTO: 36.1 PG (ref 26.5–33)
MCHC RBC AUTO-ENTMCNC: 35.4 G/DL (ref 31.5–36.5)
MCV RBC AUTO: 102 FL (ref 78–100)
MONOCYTES # BLD AUTO: 0.7 10E9/L (ref 0–1.3)
MONOCYTES NFR BLD AUTO: 6.1 %
NEUTROPHILS # BLD AUTO: 9.1 10E9/L (ref 1.6–8.3)
NEUTROPHILS NFR BLD AUTO: 85.6 %
PLATELET # BLD AUTO: 164 10E9/L (ref 150–450)
POTASSIUM SERPL-SCNC: 4.5 MMOL/L (ref 3.4–5.3)
PROT SERPL-MCNC: 7.3 G/DL (ref 6.8–8.8)
RBC # BLD AUTO: 4.15 10E12/L (ref 4.4–5.9)
SODIUM SERPL-SCNC: 141 MMOL/L (ref 133–144)
WBC # BLD AUTO: 10.7 10E9/L (ref 4–11)

## 2017-06-27 PROCEDURE — 99214 OFFICE O/P EST MOD 30 MIN: CPT | Performed by: PHYSICIAN ASSISTANT

## 2017-06-27 PROCEDURE — 85025 COMPLETE CBC W/AUTO DIFF WBC: CPT | Performed by: PHYSICIAN ASSISTANT

## 2017-06-27 PROCEDURE — 82150 ASSAY OF AMYLASE: CPT | Performed by: PHYSICIAN ASSISTANT

## 2017-06-27 PROCEDURE — 36415 COLL VENOUS BLD VENIPUNCTURE: CPT | Performed by: PHYSICIAN ASSISTANT

## 2017-06-27 PROCEDURE — 80053 COMPREHEN METABOLIC PANEL: CPT | Performed by: PHYSICIAN ASSISTANT

## 2017-06-27 PROCEDURE — 83690 ASSAY OF LIPASE: CPT | Performed by: PHYSICIAN ASSISTANT

## 2017-06-27 NOTE — PROGRESS NOTES
"SUBJECTIVE:   Josh Seth is a 66 year old male presenting with a chief complaint of feeling unsteady for \"months\".  Stopped taking Lisinopril \"because it is destroying my body\" about 2 months ago, after reading the side effect profile.     [facility].  He started him on amlopidipine, but he stopped taking it about 2 weeks ago.     He states that his BP at home this morning was 128/75.    He is here today because he is \"tired of fainting at work\", which last happened 6 months ago.  He states that his BP at that time was 70/40.    He states that he has 2 vodka drinks every day at 3pm upon arriving home from work and sleeps until 9pm, then he works at home overnight.  He smokes 1 pack a day, down from 2 packs per day about a year ago.  He states that he started smoking at age 13.     He states that he has not eaten in 2 days.  Vomited yesterday 3-4 times.  No diarrhea.        Past Medical History:   Diagnosis Date     Acute duodenal ulcer without mention of hemorrhage, perforation, or obstruction      Allergic rhinitis, cause unspecified      MGUS (monoclonal gammopathy of unknown significance) 2/20/2017     Mumps     PATIENT IS NOT SURE     Other and unspecified hyperlipidemia      Prostatitis, unspecified      Sprain of neck      Tobacco use disorder      Unspecified essential hypertension      Unspecified sinusitis (chronic)      Patient Active Problem List   Diagnosis     Pes planus     Allergic rhinitis     Essential hypertension     Tobacco abuse     Hyperlipidemia with target LDL less than 130     Adrenal adenoma     Diverticulosis of large intestine     Advanced directives, counseling/discussion     MGUS (monoclonal gammopathy of unknown significance)     Social History   Substance Use Topics     Smoking status: Current Every Day Smoker     Packs/day: 0.50     Years: 35.00     Types: Cigarettes     Smokeless tobacco: Never Used      Comment: 2-3 cigarettes " daily     Alcohol use 1.2 oz/week     2 Standard drinks or equivalent per week      Comment: 2 glasses daily       ROS:  CONSTITUTIONAL:NEGATIVE for fever, chills, change in weight  INTEGUMENTARY/SKIN: NEGATIVE for worrisome rashes, moles or lesions  EYES: NEGATIVE for vision changes or irritation  ENT/MOUTH: negative  RESP:negative  CV: NEGATIVE for chest pain, palpitations or peripheral edema  GI: NEGATIVE for nausea, abdominal pain, heartburn, or change in bowel habits  MUSCULOSKELETAL: feels unstable.    OBJECTIVE  :BP (!) 170/110 (BP Location: Right arm, Patient Position: Chair, Cuff Size: Adult Regular)  Pulse 116  Temp 98  F (36.7  C) (Oral)  Wt 174 lb (78.9 kg)  SpO2 98%  BMI 22.96 kg/m2  GENERAL APPEARANCE: healthy, alert and no distress  EYES: EOMI,  PERRL, conjunctiva clear  HENT: ear canals and TM's normal.  Nose and mouth without ulcers, erythema or lesions  NECK: supple, nontender, no lymphadenopathy  RESP: lungs clear to auscultation - no rales, rhonchi or wheezes  CV: regular rates and rhythm, normal S1 S2, no murmur noted  ABDOMEN:  soft, nontender, no HSM or masses and bowel sounds normal  NEURO: Normal strength and tone, sensory exam grossly normal,  normal speech and mentation  SKIN: no suspicious lesions or rashes    Recheck of /100    (R26.81) General unsteadiness  (primary encounter diagnosis)  Comment:   Plan: CBC with platelets and differential,         Comprehensive metabolic panel (BMP + Alb, Alk         Phos, ALT, AST, Total. Bili, TP), Lipase,         Amylase            (I10) Essential hypertension  Comment:   Plan: patient has Norvasc 5mg at home.  He will take as previously directed, BID.     (K75.9) Hepatitis  Comment:   Plan: follow up with PCP fore re-check of labs within 3-4 weeks.  Appointment made for patient to establish care with Dr. Olivas 7/21/17.    (R71.8) Elevated MCV  Comment:   Plan: discussed decreasing alcohol intake      Patient expresses understanding and  agreement with the assessment and plan as above.

## 2017-06-27 NOTE — NURSING NOTE
"Chief Complaint   Patient presents with     Dizziness     states feel off balance. Has discontinued his BP meds recently. Denies any headaches.       Initial BP (!) 170/110 (BP Location: Right arm, Patient Position: Chair, Cuff Size: Adult Regular)  Pulse 116  Temp 98  F (36.7  C) (Oral)  Wt 174 lb (78.9 kg)  SpO2 98%  BMI 22.96 kg/m2 Estimated body mass index is 22.96 kg/(m^2) as calculated from the following:    Height as of 4/20/17: 6' 1\" (1.854 m).    Weight as of this encounter: 174 lb (78.9 kg).  Medication Reconciliation: complete   Breana SCOTT    "

## 2017-06-27 NOTE — MR AVS SNAPSHOT
After Visit Summary   6/27/2017    Josh Seth    MRN: [id number]           Patient Information     Date Of Birth          1950        Visit Information        Provider Department      6/27/2017 12:55 PM Lakshmi Snyder PA-C [facility]        Today's Diagnoses     General unsteadiness    -  1    Essential hypertension        Hepatitis        Elevated MCV          Care Instructions      (R26.81) General unsteadiness  (primary encounter diagnosis)  Comment:   Plan: CBC with platelets and differential,         Comprehensive metabolic panel (BMP + Alb, Alk         Phos, ALT, AST, Total. Bili, TP), Lipase,         Amylase  Follow up with primary clinic            (I10) Essential hypertension  Comment:   Plan: patient has Norvasc 5mg at home.  He will take as previously directed, 1 tablet twice a day   Recheck of /100  Follow up with primary clinic within 3-4 weeks.     (K75.9) Hepatitis  Comment:   Plan: follow up with PCP fore re-check of labs within 3-4 weeks.  Appointment made for patient to establish care with Dr. Olivas 7/21/17.    (R71.8) Elevated MCV  Comment:   Plan: discussed decreasing alcohol intake    To ED should symptoms worsen in any way.          Follow-ups after your visit        Your next 10 appointments already scheduled     Jul 21, 2017  9:15 AM CDT   Office Visit with Kira [facility]ivas MD   [facility]    [address] [address] [phone]           Bring a current list of meds and any records pertaining to this visit.  For Physicals, please bring immunization records and any forms needing to be filled out.  Please arrive 10 minutes early to complete paperwork.              Who to contact     If you have questions or need follow up information about today's clinic visit or your schedule please contact [facility]  "directly at [phone].  Normal or non-critical lab and imaging results will be communicated to you by "[employer]hart, letter or phone within 4 business days after the clinic has received the results. If you do not hear from us within 7 days, please contact the clinic through "[employer]hart or phone. If you have a critical or abnormal lab result, we will notify you by phone as soon as possible.  Submit refill requests through [employer] or call your pharmacy and they will forward the refill request to us. Please allow 3 business days for your refill to be completed.          Additional Information About Your Visit        "[employer] Information     [employer] lets you send messages to your doctor, view your test results, renew your prescriptions, schedule appointments and more. To sign up, go to [email or web address] . Click on \"Log in\" on the left side of the screen, which will take you to the Welcome page. Then click on \"Sign up Now\" on the right side of the page.     You will be asked to enter the access code listed below, as well as some personal information. Please follow the directions to create your username and password.     Your access code is: [email or web address]  Expires: 2017  5:58 PM     Your access code will  in 90 days. If you need help or a new code, please call your [facility] clinic or [phone].        Care EveryWhere ID     This is your Care EveryWhere ID. This could be used by other organizations to access your [facility] medical records  [id number]        Your Vitals Were     Pulse Temperature Pulse Oximetry BMI (Body Mass Index)          116 98  F (36.7  C) (Oral) 98% 22.96 kg/m2         Blood Pressure from Last 3 Encounters:   17 (!) 160/100   17 138/70   17 160/88    Weight from Last 3 Encounters:   17 174 lb (78.9 kg)   17 185 lb 9.6 oz (84.2 kg)   17 190 lb (86.2 kg)              We Performed the Following     Amylase     CBC with platelets and differential     " Comprehensive metabolic panel (BMP + Alb, Alk Phos, ALT, AST, Total. Bili, TP)     Lipase        Primary Care Provider    None Specified       No primary provider on file.        Equal Access to Services     GERARDO LOMAX : Hadii aad ku hadhongstephania Patmaren, you nilsonalyssa, jaciel kajuan sanches, elsie oracioin hayaan thierrydonavon merida lapepitomarti lona. So [facility] [place] [phone].    ATENCIÓN: Si habla español, tiene a em disposición servicios gratuitos de asistencia lingüística. Llame al [phone].    We comply with applicable federal civil rights laws and [place] laws. We do not discriminate on the basis of race, color, national origin, age, disability sex, sexual orientation or gender identity.            Thank you!     Thank you for choosing [facility]  for your care. Our goal is always to provide you with excellent care. Hearing back from our patients is one way we can continue to improve our services. Please take a few minutes to complete the written survey that you may receive in the mail after your visit with us. Thank you!             Your Updated Medication List - Protect others around you: Learn how to safely use, store and throw away your medicines at www.disposemymeds.org.          This list is accurate as of: 6/27/17  3:15 PM.  Always use your most recent med list.                   Brand Name Dispense Instructions for use Diagnosis    alfuzosin 10 MG 24 hr tablet    UROXATRAL    30 tablet    Take 1 tablet (10 mg) by mouth daily    Urinary frequency       lisinopril 10 MG tablet    PRINIVIL/ZESTRIL    60 tablet    1 tab twice a day    Essential hypertension

## 2017-06-27 NOTE — PATIENT INSTRUCTIONS
(R26.81) General unsteadiness  (primary encounter diagnosis)  Comment:   Plan: CBC with platelets and differential,         Comprehensive metabolic panel (BMP + Alb, Alk         Phos, ALT, AST, Total. Bili, TP), Lipase,         Amylase  Follow up with primary clinic            (I10) Essential hypertension  Comment:   Plan: patient has Norvasc 5mg at home.  He will take as previously directed, 1 tablet twice a day   Recheck of /100  Follow up with primary clinic within 3-4 weeks.     (K75.9) Hepatitis  Comment:   Plan: follow up with PCP fore re-check of labs within 3-4 weeks.  Appointment made for patient to establish care with Dr. Olivas 7/21/17.    (R71.8) Elevated MCV  Comment:   Plan: discussed decreasing alcohol intake    To ED should symptoms worsen in any way.

## 2017-06-27 NOTE — LETTER
[facility]  [address] [address]  [phone]      June 27, 2017    RE:  Josh Seth                                                                                                                                                       [address] [facility] [address]            To whom it may concern:    Johs Seth was see in clinic today.        Sincerely,        Lakshmi Chan [facility]

## 2017-07-11 ENCOUNTER — HOSPITAL ENCOUNTER (EMERGENCY)
Facility: CLINIC | Age: 67
Discharge: HOME | End: 2017-07-11
Attending: EMERGENCY MEDICINE | Admitting: EMERGENCY MEDICINE
Payer: COMMERCIAL

## 2017-07-11 VITALS
RESPIRATION RATE: 16 BRPM | OXYGEN SATURATION: 100 % | WEIGHT: 174 LBS | BODY MASS INDEX: 23.06 KG/M2 | HEART RATE: 95 BPM | SYSTOLIC BLOOD PRESSURE: 146 MMHG | TEMPERATURE: 98.7 F | HEIGHT: 73 IN | DIASTOLIC BLOOD PRESSURE: 102 MMHG

## 2017-07-11 DIAGNOSIS — R53.1 WEAKNESS GENERALIZED: ICD-10-CM

## 2017-07-11 DIAGNOSIS — E86.0 DEHYDRATION: ICD-10-CM

## 2017-07-11 LAB
ALBUMIN SERPL-MCNC: 3.2 G/DL (ref 3.4–5)
ALP SERPL-CCNC: 143 U/L (ref 40–150)
ALT SERPL W P-5'-P-CCNC: 85 U/L (ref 0–70)
ANION GAP SERPL CALCULATED.3IONS-SCNC: 10 MMOL/L (ref 3–14)
AST SERPL W P-5'-P-CCNC: 97 U/L (ref 0–45)
BASOPHILS # BLD AUTO: 0 10E9/L (ref 0–0.2)
BASOPHILS NFR BLD AUTO: 0.6 %
BILIRUB SERPL-MCNC: 0.4 MG/DL (ref 0.2–1.3)
BUN SERPL-MCNC: 8 MG/DL (ref 7–30)
CALCIUM SERPL-MCNC: 9.1 MG/DL (ref 8.5–10.1)
CHLORIDE SERPL-SCNC: 108 MMOL/L (ref 94–109)
CO2 SERPL-SCNC: 24 MMOL/L (ref 20–32)
CREAT SERPL-MCNC: 0.83 MG/DL (ref 0.66–1.25)
DIFFERENTIAL METHOD BLD: ABNORMAL
EOSINOPHIL # BLD AUTO: 0 10E9/L (ref 0–0.7)
EOSINOPHIL NFR BLD AUTO: 0.3 %
ERYTHROCYTE [DISTWIDTH] IN BLOOD BY AUTOMATED COUNT: 15.3 % (ref 10–15)
GFR SERPL CREATININE-BSD FRML MDRD: ABNORMAL ML/MIN/1.7M2
GLUCOSE SERPL-MCNC: 134 MG/DL (ref 70–99)
HCT VFR BLD AUTO: 38.6 % (ref 40–53)
HGB BLD-MCNC: 13.6 G/DL (ref 13.3–17.7)
IMM GRANULOCYTES # BLD: 0 10E9/L (ref 0–0.4)
IMM GRANULOCYTES NFR BLD: 0.1 %
LIPASE SERPL-CCNC: 178 U/L (ref 73–393)
LYMPHOCYTES # BLD AUTO: 1 10E9/L (ref 0.8–5.3)
LYMPHOCYTES NFR BLD AUTO: 14.4 %
MCH RBC QN AUTO: 35.8 PG (ref 26.5–33)
MCHC RBC AUTO-ENTMCNC: 35.2 G/DL (ref 31.5–36.5)
MCV RBC AUTO: 102 FL (ref 78–100)
MONOCYTES # BLD AUTO: 0.6 10E9/L (ref 0–1.3)
MONOCYTES NFR BLD AUTO: 8.5 %
NEUTROPHILS # BLD AUTO: 5.4 10E9/L (ref 1.6–8.3)
NEUTROPHILS NFR BLD AUTO: 76.1 %
NRBC # BLD AUTO: 0 10*3/UL
NRBC BLD AUTO-RTO: 0 /100
PLATELET # BLD AUTO: 173 10E9/L (ref 150–450)
POTASSIUM SERPL-SCNC: 3.6 MMOL/L (ref 3.4–5.3)
PROT SERPL-MCNC: 6.4 G/DL (ref 6.8–8.8)
RBC # BLD AUTO: 3.8 10E12/L (ref 4.4–5.9)
SODIUM SERPL-SCNC: 142 MMOL/L (ref 133–144)
TROPONIN I SERPL-MCNC: NORMAL UG/L (ref 0–0.04)
WBC # BLD AUTO: 7.1 10E9/L (ref 4–11)

## 2017-07-11 PROCEDURE — 25000128 H RX IP 250 OP 636: Performed by: EMERGENCY MEDICINE

## 2017-07-11 PROCEDURE — 96360 HYDRATION IV INFUSION INIT: CPT

## 2017-07-11 PROCEDURE — 25000132 ZZH RX MED GY IP 250 OP 250 PS 637: Performed by: EMERGENCY MEDICINE

## 2017-07-11 PROCEDURE — 85025 COMPLETE CBC W/AUTO DIFF WBC: CPT | Performed by: EMERGENCY MEDICINE

## 2017-07-11 PROCEDURE — 96361 HYDRATE IV INFUSION ADD-ON: CPT

## 2017-07-11 PROCEDURE — 99284 EMERGENCY DEPT VISIT MOD MDM: CPT | Mod: 25

## 2017-07-11 PROCEDURE — 83690 ASSAY OF LIPASE: CPT | Performed by: EMERGENCY MEDICINE

## 2017-07-11 PROCEDURE — 84484 ASSAY OF TROPONIN QUANT: CPT | Performed by: EMERGENCY MEDICINE

## 2017-07-11 PROCEDURE — 99284 EMERGENCY DEPT VISIT MOD MDM: CPT | Mod: Z6 | Performed by: EMERGENCY MEDICINE

## 2017-07-11 PROCEDURE — 80053 COMPREHEN METABOLIC PANEL: CPT | Performed by: EMERGENCY MEDICINE

## 2017-07-11 PROCEDURE — 93005 ELECTROCARDIOGRAM TRACING: CPT

## 2017-07-11 RX ORDER — FOLIC ACID 1 MG/1
1 TABLET ORAL ONCE
Status: COMPLETED | OUTPATIENT
Start: 2017-07-11 | End: 2017-07-11

## 2017-07-11 RX ORDER — LANOLIN ALCOHOL/MO/W.PET/CERES
100 CREAM (GRAM) TOPICAL ONCE
Status: COMPLETED | OUTPATIENT
Start: 2017-07-11 | End: 2017-07-11

## 2017-07-11 RX ORDER — SODIUM CHLORIDE 9 MG/ML
1000 INJECTION, SOLUTION INTRAVENOUS CONTINUOUS
Status: DISCONTINUED | OUTPATIENT
Start: 2017-07-11 | End: 2017-07-11 | Stop reason: HOSPADM

## 2017-07-11 RX ORDER — MULTIVITAMIN,THERAPEUTIC
1 TABLET ORAL ONCE
Status: COMPLETED | OUTPATIENT
Start: 2017-07-11 | End: 2017-07-11

## 2017-07-11 RX ORDER — MAGNESIUM OXIDE 400 MG/1
800 TABLET ORAL ONCE
Status: COMPLETED | OUTPATIENT
Start: 2017-07-11 | End: 2017-07-11

## 2017-07-11 RX ADMIN — FOLIC ACID 1 MG: 1 TABLET ORAL at 10:25

## 2017-07-11 RX ADMIN — THERA TABS 1 TABLET: TAB at 10:25

## 2017-07-11 RX ADMIN — SODIUM CHLORIDE 1000 ML: 9 INJECTION, SOLUTION INTRAVENOUS at 09:46

## 2017-07-11 RX ADMIN — MAGNESIUM OXIDE TAB 400 MG (241.3 MG ELEMENTAL MG) 800 MG: 400 (241.3 MG) TAB at 10:25

## 2017-07-11 RX ADMIN — Medication 100 MG: at 10:25

## 2017-07-11 RX ADMIN — SODIUM CHLORIDE, POTASSIUM CHLORIDE, SODIUM LACTATE AND CALCIUM CHLORIDE 1000 ML: 600; 310; 30; 20 INJECTION, SOLUTION INTRAVENOUS at 10:40

## 2017-07-11 RX ADMIN — SODIUM CHLORIDE 1000 ML: 9 INJECTION, SOLUTION INTRAVENOUS at 10:24

## 2017-07-11 ASSESSMENT — ENCOUNTER SYMPTOMS
GASTROINTESTINAL NEGATIVE: 1
DIAPHORESIS: 1
CARDIOVASCULAR NEGATIVE: 1
RESPIRATORY NEGATIVE: 1
WEAKNESS: 1
DIZZINESS: 1
TREMORS: 1

## 2017-07-11 NOTE — DISCHARGE INSTRUCTIONS
See your MD/Clinic in follow up  Your liver tests are elevated, you should stop drinking alcohol  You may return to the ER if any problems or concerns

## 2017-07-11 NOTE — ED NOTES
Pt BIBA from work, reported shaking, lightheaded, diaphoretic, intermitent nausea. Concern per EMS for detox from etoh, pt reportedly smellls of etoh, Breathalizer enroute 0.020. Pt admitts to drinking daily, denies today.  18 gu L hand

## 2017-07-11 NOTE — ED AVS SNAPSHOT
[facility], [place], Emergency Department    [address] [facility] [address]    Phone:  [phone]                                       Josh Seth   MRN: [id number]    Department:  [facility], Emergency Department   Date of Visit:  7/11/2017           After Visit Summary Signature Page     I have received my discharge instructions, and my questions have been answered. I have discussed any challenges I see with this plan with the nurse or doctor.    ..........................................................................................................................................  Patient/Patient Representative Signature      ..........................................................................................................................................  Patient Representative Print Name and Relationship to Patient    ..................................................               ................................................  Date                                            Time    ..........................................................................................................................................  Reviewed by Signature/Title    ...................................................              ..............................................  Date                                                            Time

## 2017-07-11 NOTE — ED PROVIDER NOTES
[place] EMERGENCY DEPARTMENT ([facility])  July 11, 2017  ED 12 9:30 AM   History     Chief Complaint   Patient presents with     Dizziness     Pt BIBA from work, reported shaking, lightheaded, diaphoretic, intermitent nausea. Concern per EMS for detox from etoh, pt reportedly smellls of etoh, Breathalizer enroute 0.020. Pt admitts to drinking daily, denies today.  18 gu L hand     The history is provided by the patient and medical records.     Josh Seth is a 66 year old male who presents with lightheadedness, diaphoresis and nausea that started this morning. He has a history of hypertension, longstanding smoking history and alcohol use. He was previously followed at [facility]. He states that he has been  treated a lot lately by doctors for being unsteady.  He notes fainting spells when his blood pressure drops to 70/40s, losing all his muscle mass, feeling fatigued and unsteady to point where he has difficulty walking from parking ramp to the building. He has lost some 36 lbs over the course of the past 2 years. He feels these symptoms are all due to the lisinopril after reading a list of potential side effects. He states that his primary care doctor has insisted that he take blood pressure medications which he feels is the source cause of his ailments. He has only been intermittently compliant with the lisinopril due to misgivings. He had cardiac echo showing mild concentric LVH, normal chest xray, as well as an EKG.   He disagreed with his doctor at [facility], so he sought medical attention elsewhere. He states this is his second Emergency Department visit; the first Emergency Department visit was last month at the [facility] and they switched him from lisinopril to amlodipine. He reports compliance with this. 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 Suddenly his head and face  "started  gushing with sweat.  He got up, walked out and went outside to sit on the carpet. He continued to sweat while resting and he had no idea what was going on. 911 was called and medics noted smell of alcohol on his breath. His breathalyzer en route was 0.020. EMS did do EKG prior to arrival. Here patient admits to drinking \"about 2 drinks\" a day--each \"drink\" is a glass containing 3-4 shots of vodka. He gets up at 4 AM to get to work at 6 AM gets home at 2:30 PM and has 3-4 shots of vodka and takes a nap. 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 He states he didn't drink at all until \"this stuff started happening.\" He notes trying to care for his ailing mother at home, who had dementia and longer recognized him, and trying to keep her from entering a nursing home...ultimately she  last year. His other providers have noted this level of alcohol use and have attempted to  him to decrease his use, but it appears there has been no change. He was seen at [facility] on 17 for his unsteadiness, and noted at that time that he had not eaten in 2 days, vomited 3-4 times and had been drinking 2 vodka drinks daily.  Patient has been smoking since age 13. No history of lung disease, heart disease, diabetes. He denies headache. No pain at this time. He notes baseline tachycardia. He has never been on a beta blocker. He did not take amlodipine today.  He is not on any other medications.     Patient is a disabled [place] war vet.    I have reviewed the Medications, Allergies, Past Medical and Surgical History, and Social History in the iAmplify system.  PAST MEDICAL HISTORY:   Past Medical History:   Diagnosis Date     Acute duodenal ulcer without mention of hemorrhage, perforation, or obstruction      Allergic rhinitis, cause unspecified      MGUS (monoclonal gammopathy of unknown significance) 2017     Mumps     PATIENT " "IS NOT SURE     Other and unspecified hyperlipidemia      Prostatitis, unspecified      Sprain of neck      Tobacco use disorder      Unspecified essential hypertension      Unspecified sinusitis (chronic)        PAST SURGICAL HISTORY:   Past Surgical History:   Procedure Laterality Date     C NONSPECIFIC PROCEDURE      eye injury (R)  OD op     C NONSPECIFIC PROCEDURE      nose operation       FAMILY HISTORY:   Family History   Problem Relation Age of Onset     DIABETES Mother      b-1926     Cardiovascular Mother      quintuple bypass       SOCIAL HISTORY:   Social History   Substance Use Topics     Smoking status: Current Every Day Smoker     Packs/day: 0.50     Years: 35.00     Types: Cigarettes     Smokeless tobacco: Never Used      Comment: 2-3 cigarettes daily     Alcohol use 1.2 oz/week     2 Standard drinks or equivalent per week      Comment: 2 glasses daily       Patient's Medications   New Prescriptions    No medications on file   Previous Medications    ALFUZOSIN (UROXATRAL) 10 MG 24 HR TABLET    Take 1 tablet (10 mg) by mouth daily    LISINOPRIL (PRINIVIL/ZESTRIL) 10 MG TABLET    1 tab twice a day   Modified Medications    No medications on file   Discontinued Medications    No medications on file          Allergies   Allergen Reactions     Codeine      gi     Erythromycin      gi     Sulfa Drugs       Review of Systems   Constitutional: Positive for diaphoresis.   Respiratory: Negative.    Cardiovascular: Negative.    Gastrointestinal: Negative.    Neurological: Positive for dizziness, tremors and weakness.   All other systems reviewed and are negative.      Physical Exam    /87  Pulse 95  Temp 98.7  F (37.1  C) (Oral)  Resp 15  Ht 1.854 m (6' 1\")  Wt 78.9 kg (174 lb)  SpO2 98%  BMI 22.96 kg/m2   Physical Exam   Constitutional: He is oriented to person, place, and time. He appears well-developed and well-nourished. No distress.   HENT:   Head: Normocephalic and atraumatic.   Mouth/Throat: " Oropharynx is clear and moist.   Eyes: Conjunctivae and EOM are normal. Pupils are equal, round, and reactive to light. No scleral icterus.   Neck: Neck supple.   Cardiovascular: Normal rate, regular rhythm and normal heart sounds.    Pulmonary/Chest: Effort normal and breath sounds normal. No respiratory distress. He has no wheezes.   Abdominal: Soft. There is no tenderness.   Neurological: He is alert and oriented to person, place, and time. No cranial nerve deficit.   Skin: Skin is warm and dry.   Psychiatric: He has a normal mood and affect. His behavior is normal.   Nursing note and vitals reviewed.      ED Course     ED Course     Procedures             EKG Interpretation:      Interpreted by Geoff Monk  Time reviewed: 0940  Symptoms at time of EKG: weakness   Rhythm: normal sinus   Rate: normal  Axis: normal  Ectopy: none  Conduction: normal  ST Segments/ T Waves: No ST-T wave changes  Q Waves: none  Comparison to prior: Unchanged from 12/16    Clinical Impression: normal EKG      Medications   0.9% sodium chloride BOLUS (0 mLs Intravenous Stopped 7/11/17 1024)     Followed by   0.9% sodium chloride infusion (0 mLs Intravenous Paused 7/11/17 1040)   multivitamin, therapeutic (THERA-VIT) tablet 1 tablet (1 tablet Oral Given 7/11/17 1025)   folic acid (FOLVITE) tablet 1 mg (1 mg Oral Given 7/11/17 1025)   thiamine tablet 100 mg (100 mg Oral Given 7/11/17 1025)   magnesium oxide (MAG-OX) tablet 800 mg (800 mg Oral Given 7/11/17 1025)   lactated ringers BOLUS 1,000 mL (0 mLs Intravenous Stopped 7/11/17 1132)         Results for orders placed or performed during the hospital encounter of 07/11/17 (from the past 24 hour(s))   Lipase   Result Value Ref Range    Lipase 178 73 - 393 U/L   Comprehensive metabolic panel   Result Value Ref Range    Sodium 142 133 - 144 mmol/L    Potassium 3.6 3.4 - 5.3 mmol/L    Chloride 108 94 - 109 mmol/L    Carbon Dioxide 24 20 - 32 mmol/L    Anion Gap 10 3 - 14 mmol/L     Glucose 134 (H) 70 - 99 mg/dL    Urea Nitrogen 8 7 - 30 mg/dL    Creatinine 0.83 0.66 - 1.25 mg/dL    GFR Estimate >90  Non  GFR Calc   >60 mL/min/1.7m2    GFR Estimate If Black >90   GFR Calc   >60 mL/min/1.7m2    Calcium 9.1 8.5 - 10.1 mg/dL    Bilirubin Total 0.4 0.2 - 1.3 mg/dL    Albumin 3.2 (L) 3.4 - 5.0 g/dL    Protein Total 6.4 (L) 6.8 - 8.8 g/dL    Alkaline Phosphatase 143 40 - 150 U/L    ALT 85 (H) 0 - 70 U/L    AST 97 (H) 0 - 45 U/L   CBC with platelets differential   Result Value Ref Range    WBC 7.1 4.0 - 11.0 10e9/L    RBC Count 3.80 (L) 4.4 - 5.9 10e12/L    Hemoglobin 13.6 13.3 - 17.7 g/dL    Hematocrit 38.6 (L) 40.0 - 53.0 %     (H) 78 - 100 fl    MCH 35.8 (H) 26.5 - 33.0 pg    MCHC 35.2 31.5 - 36.5 g/dL    RDW 15.3 (H) 10.0 - 15.0 %    Platelet Count 173 150 - 450 10e9/L    Diff Method Automated Method     % Neutrophils 76.1 %    % Lymphocytes 14.4 %    % Monocytes 8.5 %    % Eosinophils 0.3 %    % Basophils 0.6 %    % Immature Granulocytes 0.1 %    Nucleated RBCs 0 0 /100    Absolute Neutrophil 5.4 1.6 - 8.3 10e9/L    Absolute Lymphocytes 1.0 0.8 - 5.3 10e9/L    Absolute Monocytes 0.6 0.0 - 1.3 10e9/L    Absolute Eosinophils 0.0 0.0 - 0.7 10e9/L    Absolute Basophils 0.0 0.0 - 0.2 10e9/L    Abs Immature Granulocytes 0.0 0 - 0.4 10e9/L    Absolute Nucleated RBC 0.0    Troponin I (now + 6 & 12 hrs)   Result Value Ref Range    Troponin I ES  0.000 - 0.045 ug/L     <0.015  The 99th percentile for upper reference range is 0.045 ug/L.  Troponin values in   the range of 0.045 - 0.120 ug/L may be associated with risks of adverse   clinical events.     EKG 12-lead, tracing only   Result Value Ref Range    Interpretation ECG Click View Image link to view waveform and result      Medications   0.9% sodium chloride BOLUS (0 mLs Intravenous Stopped 7/11/17 1024)     Followed by   0.9% sodium chloride infusion (0 mLs Intravenous Paused 7/11/17 1040)   multivitamin,  therapeutic (THERA-VIT) tablet 1 tablet (1 tablet Oral Given 7/11/17 1025)   folic acid (FOLVITE) tablet 1 mg (1 mg Oral Given 7/11/17 1025)   thiamine tablet 100 mg (100 mg Oral Given 7/11/17 1025)   magnesium oxide (MAG-OX) tablet 800 mg (800 mg Oral Given 7/11/17 1025)   lactated ringers BOLUS 1,000 mL (0 mLs Intravenous Stopped 7/11/17 1132)         Assessments & Plan (with Medical Decision Making)   66-year-old male who comes to the Emergency Department from work where he had an episode of diaphoresis, feeling weak, dizzy, and shaking.  His evaluation here was essentially negative. He has normal EKG, negative troponin, and normal CBC. His electrolytes are normal though he does have elevated LFTs. I believe this gentleman is an alcoholic in denial. I think some of his symptoms this morning were withdrawal. Paramedics did do a breathalyzer and he was 0.02.  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  I told him I was concerned about his drinking, his elevated LFTs and that there is help available if he wanted it and he declined.  He was given IV fluids, was able to stand and ambulate. He ate a meal and will be discharged home. I recommend he follow up with his primary care provider. He may return if he has any problems or concerns.    This part of the document was transcribed by Brandy Allen, Medical Scribe.      I have reviewed the nursing notes.    I have reviewed the findings, diagnosis, plan and need for follow up with the patient.    New Prescriptions    No medications on file       Final diagnoses:   Weakness generalized   Dehydration   I, Brandy Allen, am serving as a trained medical scribe to document services personally performed by Geoff Monk MD, based on the provider's statements to me on July 11, 2017.  This document has been checked and approved by Dr. Monk.      I, Geoff Monk MD, was physically present and have reviewed and verified the accuracy of this note documented by Brandy Allen, medical scribe.      7/11/2017   [facility], [place], EMERGENCY DEPARTMENT     Geoff Monk MD  07/11/17 3883

## 2017-07-11 NOTE — ED AVS SNAPSHOT
[facility], Emergency Department    [address] [address]    Phone:  [phone]                                       Josh Seth   MRN: [id number]    Department:  [facility], Emergency Department   Date of Visit:  7/11/2017           Patient Information     Date Of Birth          1950        Your diagnoses for this visit were:     Weakness generalized     Dehydration        You were seen by Geoff Monk MD.        Discharge Instructions       See your MD/Clinic in follow up  Your liver tests are elevated, you should stop drinking alcohol  You may return to the ER if any problems or concerns    Future Appointments        Provider Department Dept Phone Center    7/21/2017 9:15 AM Kira Olivas MD [facility] [phone]       24 Hour Appointment Hotline       To make an appointment at any [facility], call [phone] ([phone]). If you don't have a family doctor or clinic, we will help you find one. [facility] are conveniently located to serve the needs of you and your family.             Review of your medicines      Our records show that you are taking the medicines listed below. If these are incorrect, please call your family doctor or clinic.        Dose / Directions Last dose taken    alfuzosin 10 MG 24 hr tablet   Commonly known as:  UROXATRAL   Dose:  10 mg   Quantity:  30 tablet        Take 1 tablet (10 mg) by mouth daily   Refills:  3        lisinopril 10 MG tablet   Commonly known as:  PRINIVIL/ZESTRIL   Quantity:  60 tablet        1 tab twice a day   Refills:  11                Procedures and tests performed during your visit     CBC with platelets differential    Comprehensive metabolic panel    EKG 12-lead, tracing only    Lipase    Troponin I (now + 6 & 12 hrs)      Orders Needing Specimen Collection     None      Pending Results     Date and Time Order Name Status Description    7/11/2017 0930 EKG  "12-lead, tracing only Preliminary             Pending Culture Results     No orders found from 2017 to 2017.            Pending Results Instructions     If you had any lab results that were not finalized at the time of your Discharge, you can call the ED Lab Result RN at [phone]. You will be contacted by this team for any positive Lab results or changes in treatment. The nurses are available 7 days a week from 10A to 6:30P.  You can leave a message 24 hours per day and they will return your call.        Thank you for choosing [facility]       Thank you for choosing [facility] for your care. Our goal is always to provide you with excellent care. Hearing back from our patients is one way we can continue to improve our services. Please take a few minutes to complete the written survey that you may receive in the mail after you visit with us. Thank you!        SMB Suitehart Information     IndianStage lets you send messages to your doctor, view your test results, renew your prescriptions, schedule appointments and more. To sign up, go to [email or web address] . Click on \"Log in\" on the left side of the screen, which will take you to the Welcome page. Then click on \"Sign up Now\" on the right side of the page.     You will be asked to enter the access code listed below, as well as some personal information. Please follow the directions to create your username and password.     Your access code is: [email or web address]  Expires: 2017  5:58 PM     Your access code will  in 90 days. If you need help or a new code, please call your [facility] clinic or [phone].        Care EveryWhere ID     This is your Care EveryWhere ID. This could be used by other organizations to access your [facility] medical records  [id number]        Equal Access to Services     GERARDO LOMAX : Justine Rose, you beavers, elsie jenkins So [facility] [phone].    ATENCIÓN: " Si habla nathan, tiene a em disposición servicios gratuitos de asistencia lingüística. Llame al [phone].    We comply with applicable federal civil rights laws and [place] laws. We do not discriminate on the basis of race, color, national origin, age, disability sex, sexual orientation or gender identity.            After Visit Summary       This is your record. Keep this with you and show to your community pharmacist(s) and doctor(s) at your next visit.

## 2017-07-12 LAB — INTERPRETATION ECG - MUSE: NORMAL

## 2017-07-21 ENCOUNTER — OFFICE VISIT (OUTPATIENT)
Dept: INTERNAL MEDICINE | Facility: CLINIC | Age: 67
End: 2017-07-21
Payer: COMMERCIAL

## 2017-07-21 ENCOUNTER — TELEPHONE (OUTPATIENT)
Dept: INTERNAL MEDICINE | Facility: CLINIC | Age: 67
End: 2017-07-21

## 2017-07-21 VITALS
SYSTOLIC BLOOD PRESSURE: 150 MMHG | WEIGHT: 177.5 LBS | TEMPERATURE: 98.7 F | BODY MASS INDEX: 23.52 KG/M2 | OXYGEN SATURATION: 99 % | DIASTOLIC BLOOD PRESSURE: 100 MMHG | HEART RATE: 95 BPM | HEIGHT: 73 IN

## 2017-07-21 DIAGNOSIS — I10 BENIGN ESSENTIAL HYPERTENSION: Primary | ICD-10-CM

## 2017-07-21 DIAGNOSIS — F10.20 ALCOHOL DEPENDENCE, CONTINUOUS (H): ICD-10-CM

## 2017-07-21 PROCEDURE — 99214 OFFICE O/P EST MOD 30 MIN: CPT | Performed by: INTERNAL MEDICINE

## 2017-07-21 RX ORDER — LOSARTAN POTASSIUM AND HYDROCHLOROTHIAZIDE 25; 100 MG/1; MG/1
1 TABLET ORAL DAILY
Qty: 30 TABLET | Refills: 0 | Status: SHIPPED | OUTPATIENT
Start: 2017-07-21

## 2017-07-21 RX ORDER — AMLODIPINE BESYLATE 10 MG/1
10 TABLET ORAL DAILY
COMMUNITY
Start: 2017-07-21

## 2017-07-21 NOTE — PATIENT INSTRUCTIONS
CONTINUE amlodipine 10mg daily.    START losartan-hydrochlorothiazide 100-25mg.    Follow-up in 2-4 weeks for blood pressure check and establish care visit.

## 2017-07-21 NOTE — TELEPHONE ENCOUNTER
FMLA from [facility] Human Resources Cert of Health Care Prov for Employee s' serious health condition to be completed in your orange folder on [facility]

## 2017-07-21 NOTE — TELEPHONE ENCOUNTER
Reason for Call:  Form, our goal is to have forms completed with 72 hours, however, some forms may require a visit or additional information.    Type of letter, form or note:  FMLA    Who is the form from?: Patient    Where did the form come from: Patient or family brought in       What clinic location was the form placed at?: Internal Medicine    Where the form was placed: Given to [facility]    What number is listed as a contact on the form?: None       Additional comments: Please fax to [phone]    Call taken on 7/21/2017 at 10:48 AM by Adriana Stuart

## 2017-07-21 NOTE — MR AVS SNAPSHOT
"              After Visit Summary   7/21/2017    Josh Seth    MRN: [id number]           Patient Information     Date Of Birth          1950        Visit Information        Provider Department      7/21/2017 9:15 AM Kira Olivas MD [facility]        Today's Diagnoses     Benign essential hypertension    -  1      Care Instructions    CONTINUE amlodipine 10mg daily.    START losartan-hydrochlorothiazide 100-25mg.    Follow-up in 2-4 weeks for blood pressure check and establish care visit.           Follow-ups after your visit        Who to contact     If you have questions or need follow up information about today's clinic visit or your schedule please contact [facility][1] directly at [phone].  Normal or non-critical lab and imaging results will be communicated to you by Ferficshart, letter or phone within 4 business days after the clinic has received the results. If you do not hear from us within 7 days, please contact the clinic through Ferficshart or phone. If you have a critical or abnormal lab result, we will notify you by phone as soon as possible.  Submit refill requests through [employer]uru or call your pharmacy and they will forward the refill request to us. Please allow 3 business days for your refill to be completed.          Additional Information About Your Visit        MyChart Information     LegalGuru lets you send messages to your doctor, view your test results, renew your prescriptions, schedule appointments and more. To sign up, go to [email or web address] . Click on \"Log in\" on the left side of the screen, which will take you to the Welcome page. Then click on \"Sign up Now\" on the right side of the page.     You will be asked to enter the access code listed below, as well as some personal information. Please follow the directions to create your username and password.     Your access code is: [email or web address]  Expires: 10/19/2017  9:56 AM     Your " "access code will  in 90 days. If you need help or a new code, please call your [place] clinic or [phone].        Care EveryWhere ID     This is your Care EveryWhere ID. This could be used by other organizations to access your [place] medical records  [id number]        Your Vitals Were     Pulse Temperature Height Pulse Oximetry BMI (Body Mass Index)       95 98.7  F (37.1  C) (Oral) 6' 1\" (1.854 m) 99% 23.42 kg/m2        Blood Pressure from Last 3 Encounters:   17 (!) 150/100   17 (!) 146/102   17 (!) 160/100    Weight from Last 3 Encounters:   17 177 lb 8 oz (80.5 kg)   17 174 lb (78.9 kg)   17 174 lb (78.9 kg)              Today, you had the following     No orders found for display         Today's Medication Changes          These changes are accurate as of: 17  9:56 AM.  If you have any questions, ask your nurse or doctor.               Start taking these medicines.        Dose/Directions    losartan-hydrochlorothiazide 100-25 MG per tablet   Commonly known as:  HYZAAR   Used for:  Benign essential hypertension   Started by:  Kira Olivas MD        Dose:  1 tablet   Take 1 tablet by mouth daily   Quantity:  30 tablet   Refills:  0         Stop taking these medicines if you haven't already. Please contact your care team if you have questions.     lisinopril 10 MG tablet   Commonly known as:  PRINIVIL/ZESTRIL   Stopped by:  Kira Olivas MD                Where to get your medicines      These medications were sent to [facility] [id number] [facility]  [address] [facility] [address]     Phone:  [phone]     losartan-hydrochlorothiazide 100-25 MG per tablet                Primary Care Provider Fax #    [facility] [phone]       [address] [address]        Equal Access to Services     GERARDO LOMAX AH: Justine hernandez Soomaali, waaxda luqadajoselyn, qaybta kaalmawei " elsie sanchesdonavon benjavenu romeoaamarti ah. [facility] [phone].    ATENCIÓN: Si habla nathan, tiene a em disposición servicios gratuitos de asistencia lingüística. Jalil al [phone].    We comply with applicable federal civil rights laws and [place] laws. We do not discriminate on the basis of race, color, national origin, age, disability sex, sexual orientation or gender identity.            Thank you!     Thank you for choosing [facility]  for your care. Our goal is always to provide you with excellent care. Hearing back from our patients is one way we can continue to improve our services. Please take a few minutes to complete the written survey that you may receive in the mail after your visit with us. Thank you!             Your Updated Medication List - Protect others around you: Learn how to safely use, store and throw away your medicines at www.disposemymeds.org.          This list is accurate as of: 7/21/17  9:56 AM.  Always use your most recent med list.                   Brand Name Dispense Instructions for use Diagnosis    alfuzosin 10 MG 24 hr tablet    UROXATRAL    30 tablet    Take 1 tablet (10 mg) by mouth daily    Urinary frequency       amLODIPine 10 MG tablet    NORVASC     Take 1 tablet (10 mg) by mouth daily        losartan-hydrochlorothiazide 100-25 MG per tablet    HYZAAR    30 tablet    Take 1 tablet by mouth daily    Benign essential hypertension

## 2017-07-21 NOTE — NURSING NOTE
"Chief Complaint   Patient presents with     ER F/U     7/11/17 [facility] for Generalized weakness.       Initial BP (!) 150/100 (BP Location: Left arm, Patient Position: Chair, Cuff Size: Adult Regular)  Pulse 95  Temp 98.7  F (37.1  C) (Oral)  Ht 6' 1\" (1.854 m)  Wt 177 lb 8 oz (80.5 kg)  SpO2 99%  BMI 23.42 kg/m2 Estimated body mass index is 23.42 kg/(m^2) as calculated from the following:    Height as of this encounter: 6' 1\" (1.854 m).    Weight as of this encounter: 177 lb 8 oz (80.5 kg).  Medication Reconciliation: complete     Kaminibose MA      "

## 2017-07-21 NOTE — PROGRESS NOTES
SUBJECTIVE:                                                      HPI: Josh Seth is a pleasant 66 year old male who presents for ER follow-up:    History is challenging - patient is very talkative, frequently tangential, and difficult to redirect.    Presented to ER last week after episode of lightheadedness, sweating, and nausea.     DAPHNE 0.02 when EMS arrived.    Exam, EKG, and labs generally unremarkable other than mild transaminitis, low albumin, and macrocytosis.    Patient symptomatically improved with IV fluids.    Patient admits to chronic (~3-4 years) alcohol dependence - uses 2-4 shots of Vodka twice a day to help sleep.    Patient also admits to poor nutrition/not eating well.    Patient is also noted to have significantly elevated blood pressure today.    Currently on amlodipine 10 mg daily. Reports adverse reaction to lisinopril in the past.    He is generally suspicious of blood pressure medications because he is frequently lightheaded to the point of presyncope.    He also uses a wrist blood pressure cuff at home which provides him with normal blood pressure readings.    Discussed with patient that wrist blood pressure cuffs are frequently incorrect and underestimate actual blood pressure.    Edy discussion with patient that his alcohol dependence is concerning and that alcohol cessation is strongly recommended.  - his mild transaminitis and macrocytosis are result of his alcohol use  - his self admitted poor performance at work is likely the result of his alcohol use  - he denies history of withdrawal symptoms, but was encouraged to report to the ER if symptoms do occur    Also discussed elevated blood pressure with patient:  - he definitely has high blood pressure, BUT  - in the setting of alcohol use and poor nutrition, his blood pressures are likely to dip causing him to be symptomatic  - we can treat his blood pressure better in the absence of alcohol and with improved nutrition    The  "medication, allergy, and problem lists have been reviewed and updated as appropriate.       OBJECTIVE:                                                      BP (!) 150/100 (BP Location: Left arm, Patient Position: Chair, Cuff Size: Adult Regular)  Pulse 95  Temp 98.7  F (37.1  C) (Oral)  Ht 6' 1\" (1.854 m)  Wt 177 lb 8 oz (80.5 kg)  SpO2 99%  BMI 23.42 kg/m2  Constitutional: well-appearing  Respiratory: normal respiratory effort; clear to auscultation bilaterally  Cardiovascular: regular rate and rhythm; no edema  Gastrointestinal: soft, non-tender, non-distended, and bowel sounds present; no organomegaly or masses   Musculoskeletal: normal gait and station  Psych: normal judgment and insight; normal mood and affect; recent and remote memory intact      ASSESSMENT/PLAN:                                                      (I10) Benign essential hypertension  (primary encounter diagnosis)  Comment: poorly controlled on amlodipine 10mg  Plan:    - CONTINUE amlodipine 10 mg daily.   - START losartan-hydrochlorothiazide 100-25 mg daily.   - alcohol cessation strongly encouraged.   - better nutrition (3 meals + snacks in between meals) strongly encouraged.   - follow-up blood pressure in 2-4 weeks (encouraged to establish care visit at that time as well).    (F10.20) Alcohol dependence, continuous (H)  Comment: radha discussion with patient (see above) that alcohol use is negatively impacting his life and health.  Plan:    - Alcohol cessation strongly encouraged.   - if w/d symptoms develop (tremors, shaking, palpitations, sweating, hallucinations, feeling unwell)...    - patient should report to ER immediately.    Finally patient requested that I fill out FMLA forms for him. Discussed with patient that I'm happy to review forms and fill them out if appropriate. An additional visit may be needed to complete these. Regardless, he will require close follow-up with me including an establish care visit and better blood " pressure control.    The instructions on the AVS were discussed and explained to the patient. Patient expressed understanding of instructions.    A total of 25 minutes were spent face-to-face with this patient during this encounter and over half of that time was spent on counseling and coordination of care re: above diagnoses and plans of care.     Kira Olivas MD   [address] [address]  T: [phone], F: [phone]

## 2017-09-13 ENCOUNTER — AMBULATORY - HEALTHEAST (OUTPATIENT)
Dept: ADMINISTRATIVE | Facility: CLINIC | Age: 67
End: 2017-09-13

## 2017-09-14 ENCOUNTER — OFFICE VISIT - HEALTHEAST (OUTPATIENT)
Dept: GERIATRICS | Facility: CLINIC | Age: 67
End: 2017-09-14

## 2017-09-14 DIAGNOSIS — I71.40 ABDOMINAL AORTIC ANEURYSM (H): ICD-10-CM

## 2017-09-14 DIAGNOSIS — K85.90 ACUTE PANCREATITIS: ICD-10-CM

## 2017-09-14 DIAGNOSIS — F10.90 ALCOHOL USE DISORDER: ICD-10-CM

## 2017-09-14 DIAGNOSIS — F32.A DEPRESSION: ICD-10-CM

## 2017-09-14 DIAGNOSIS — T81.82XA: ICD-10-CM

## 2017-09-14 DIAGNOSIS — F09 MILD COGNITIVE DISORDER: ICD-10-CM

## 2017-09-14 DIAGNOSIS — N40.0 BPH (BENIGN PROSTATIC HYPERPLASIA): ICD-10-CM

## 2017-09-14 DIAGNOSIS — F17.200 TOBACCO DEPENDENCE: ICD-10-CM

## 2017-09-14 DIAGNOSIS — R62.7 FAILURE TO THRIVE IN ADULT: ICD-10-CM

## 2017-09-14 DIAGNOSIS — R41.82 ALTERED MENTAL STATUS: ICD-10-CM

## 2017-09-14 DIAGNOSIS — R91.1 PULMONARY NODULE: ICD-10-CM

## 2017-09-14 DIAGNOSIS — R63.4 WEIGHT LOSS: ICD-10-CM

## 2017-09-18 ENCOUNTER — OFFICE VISIT - HEALTHEAST (OUTPATIENT)
Dept: GERIATRICS | Facility: CLINIC | Age: 67
End: 2017-09-18

## 2017-09-18 DIAGNOSIS — F09 MILD COGNITIVE DISORDER: ICD-10-CM

## 2017-09-18 DIAGNOSIS — F10.90 ALCOHOL USE DISORDER: ICD-10-CM

## 2017-09-20 ENCOUNTER — OFFICE VISIT - HEALTHEAST (OUTPATIENT)
Dept: GERIATRICS | Facility: CLINIC | Age: 67
End: 2017-09-20

## 2017-09-20 DIAGNOSIS — F10.90 ALCOHOL USE DISORDER: ICD-10-CM

## 2017-09-20 DIAGNOSIS — F32.A DEPRESSION: ICD-10-CM

## 2017-09-20 DIAGNOSIS — F09 MILD COGNITIVE DISORDER: ICD-10-CM

## 2017-09-20 DIAGNOSIS — F17.200 TOBACCO DEPENDENCE: ICD-10-CM

## 2017-09-21 ENCOUNTER — OFFICE VISIT - HEALTHEAST (OUTPATIENT)
Dept: GERIATRICS | Facility: CLINIC | Age: 67
End: 2017-09-21

## 2017-09-21 DIAGNOSIS — R41.82 ALTERED MENTAL STATUS: ICD-10-CM

## 2017-09-21 DIAGNOSIS — F32.A DEPRESSION: ICD-10-CM

## 2017-09-21 DIAGNOSIS — F09 MILD COGNITIVE DISORDER: ICD-10-CM

## 2017-09-25 ENCOUNTER — OFFICE VISIT - HEALTHEAST (OUTPATIENT)
Dept: GERIATRICS | Facility: CLINIC | Age: 67
End: 2017-09-25

## 2017-09-25 ENCOUNTER — AMBULATORY - HEALTHEAST (OUTPATIENT)
Dept: GERIATRICS | Facility: CLINIC | Age: 67
End: 2017-09-25

## 2017-09-25 DIAGNOSIS — R41.82 ALTERED MENTAL STATUS: ICD-10-CM

## 2017-09-25 DIAGNOSIS — R63.4 WEIGHT LOSS: ICD-10-CM

## 2017-09-25 DIAGNOSIS — F09 MILD COGNITIVE DISORDER: ICD-10-CM

## 2017-09-25 DIAGNOSIS — T81.82XA: ICD-10-CM

## 2017-09-25 DIAGNOSIS — K85.90 ACUTE PANCREATITIS: ICD-10-CM

## 2017-09-25 DIAGNOSIS — F32.A DEPRESSION: ICD-10-CM

## 2017-09-25 DIAGNOSIS — F10.90 ALCOHOL USE DISORDER: ICD-10-CM

## 2021-05-31 VITALS — BODY MASS INDEX: 23.09 KG/M2 | WEIGHT: 175 LBS

## 2021-05-31 VITALS — WEIGHT: 175 LBS | BODY MASS INDEX: 23.09 KG/M2

## 2021-05-31 VITALS — WEIGHT: 170 LBS | BODY MASS INDEX: 22.43 KG/M2

## 2021-05-31 VITALS — BODY MASS INDEX: 22.38 KG/M2 | WEIGHT: 169.6 LBS

## 2021-06-13 NOTE — PROGRESS NOTES
Code Status:  FULL CODE  Visit Type: H & P     Facility:  [facility] [[id number]]      Facility Type: SNF (Skilled Nursing Facility, TCU)    History of Present Illness:   Hospital Admission Date: 2017 [facility] hospital Discharge Date: 2017  Facility Admission Date: 2017 [place] transitional care unit    Josh Seth is a 66 y.o. male who has had increased weakness and fatigue and increase use and alcohol and at one point got so confused that family cousin visited him and he did not recognize his cousin and family requested that he be brought to the emergency room for evaluation.  He had been developing a habit of drinking 2 shots of vodka twice daily as well as had developed a 40-60 pound weight loss over 1 year.  The weight loss and some alcohol habituation developed after he had cared for his mother for a number of years and then put her in the nursing home and ultimately she .  Had been living independently and had a rather elaborate story about him having some difficulties with some of the blood pressure medicines his providers have placed him on and he actually at one point fainted had some ongoing dizziness had switch providers and there was some adjustment to his medicines.  However in addition to this he apparently has a veterans disability secondary to agent orange in some confusion and difficulty with sleep as the main reasons.    Facility course the patient was admitted to the hospital because of some disorganized thoughts and possible hallucinations and clearly some signs of depression.  They in addition did a [place] cognitive assessment initially and it was 18.  They did a fairly significant workup for dementia including a B12 TSH HIV RPR which were all negative and a head CT demonstrated mild to moderate atrophy and moderate nonspecific periventricular and subcortical white matter hypodensity which may be related to small vessel  ischemic disease.  They started him on Remeron and he did have some improvements in his appetite and also his energy.  They noted that he had a palm mental sign is acutely altered mental state subsided.  They repeated his [place] on discharge and it was 26/30.  In addition they did find a elevated amylase at 2089 as well as on CT scan findings suggestive of interstitial edematous pancreatitis.  Also found some mediastinal nodes which cannot exclude possible malignancy.  Also findings of pulmonary emphysema as well as abdominal aortic aneurysm measuring 3.5 and an enlarged prostate.    Past Medical History:   Diagnosis Date     Adult failure to thrive      Alcohol use disorder      Altered mental state      Depression      HTN (hypertension)      No past surgical history on file.  Family History   Problem Relation Age of Onset     Lung cancer Mother      Social History     Social History     Marital status: Single     Spouse name: N/A     Number of children: N/A     Years of education: N/A     Occupational History     Not on file.     Social History Main Topics     Smoking status: Current Every Day Smoker     Packs/day: 1.00     Types: Cigarettes     Smokeless tobacco: Not on file     Alcohol use 1.2 oz/week     2 Shots of liquor per week      Comment: 2 shots of vadka every night for the past couple of months     Drug use: Yes     Special: Marijuana      Comment: during the war     Sexual activity: Not on file     Other Topics Concern     Not on file     Social History Narrative     No narrative on file     Additional Geriatric Review she lives alone never  no children has sisters of cousins around.  Works for the [employer] in a [employer] capacity.  Pack a day smoker since age 16.  Minimum of 4 shots of vodka a day divided in 2 and 2  Current Outpatient Prescriptions   Medication Sig Dispense Refill     amLODIPine (NORVASC) 10 MG tablet Take 10 mg by mouth every morning.       ferrous sulfate 325 (65 FE)  MG tablet Take 1 tablet by mouth once daily.       MULTIVIT-MINERALS/FERROUS FUM (MULTI VITAMIN ORAL) Take 1 capsule by mouth once daily.       omega-3 fatty acids (FISH OIL) 500 mg cap Take 1,000 mg by mouth once daily.       No current facility-administered medications for this visit.      Allergies   Allergen Reactions     Codeine      Sulfa (Sulfonamide Antibiotics)        There is no immunization history on file for this patient.      Review of Systems   Constitutional: Negative for activity change, chills, diaphoresis, fatigue and fever.   HENT: Negative for ear pain, hearing loss, sinus pressure, sore throat and trouble swallowing.    Eyes: Negative for discharge and visual disturbance.   Respiratory: Negative for cough, shortness of breath and wheezing.    Cardiovascular: Negative for chest pain, palpitations and leg swelling.   Gastrointestinal: Negative for abdominal pain, constipation and diarrhea.   Endocrine: Negative for cold intolerance and heat intolerance.   Genitourinary: Negative for difficulty urinating, dysuria, flank pain, frequency and urgency.   Musculoskeletal: Negative for arthralgias, back pain, myalgias and neck stiffness.   Allergic/Immunologic: Negative for immunocompromised state.   Neurological: Negative for dizziness, tremors, syncope, speech difficulty, weakness, light-headedness, numbness and headaches.        Physical Exam   Constitutional: He is oriented to person, place, and time. He appears well-developed and well-nourished.   HENT:   Head: Normocephalic and atraumatic.   Right Ear: External ear normal.   Left Ear: External ear normal.   Nose: Nose normal.   Mouth/Throat: Oropharynx is clear and moist.   Eyes: Conjunctivae and EOM are normal. Pupils are equal, round, and reactive to light. Right eye exhibits no discharge. Left eye exhibits no discharge.   Neck: Neck supple. No JVD present. No tracheal deviation present. No thyromegaly present.   Cardiovascular: Normal rate,  regular rhythm, normal heart sounds and intact distal pulses.    No murmur heard.  Pulmonary/Chest: Effort normal and breath sounds normal. No respiratory distress. He has no wheezes. He has no rales. He exhibits no tenderness.   Slight decreased inspiration and expiration without any adventitial sounds   Abdominal: He exhibits no distension and no mass. There is no tenderness. There is no guarding.   Musculoskeletal: Normal range of motion. He exhibits no edema or tenderness.   Lymphadenopathy:     He has no cervical adenopathy.   Neurological: He is alert and oriented to person, place, and time. He has normal reflexes. No cranial nerve deficit. Coordination normal.   Apparent gait improvement since discharging.  Has a negative Romberg and can walk although is a little bit unsteady.   Skin: Skin is warm and dry. No rash noted. No erythema.   Psychiatric: He has a normal mood and affect. His behavior is normal. Thought content normal.   Nursing note and vitals reviewed.        Labs:  All labs reviewed in the nursing home record.    Assessment:  1. Depression     2. Altered mental status     3. Mild cognitive disorder     4. Alcohol use disorder     5. Failure to thrive in adult     6. Weight loss         Plan: We will do a vitamin D 25-hydroxy because this may play a role in balance additionally we will repeat his [place] to see if his cognition is continuing to improve which implies that depression may have been what is moving this.  I will also ask for some multitask testing because there is something very odd about his presentation and possibly this could unmask this.  It may be that the stress of his job is because he can no longer perform the high and functions that he had been able to do previously.  Certainly we will need to follow-up with a CT of his chest in the next 6 months time as he continues to smoke regularly.  And we want to make sure that this mediastinal lymph node abnormality does not progress, same  for the abdominal aortic aneurysm.  In regards to the pancreas difficulty we will repeat a lipase and amylase with the vitamin D blood draw.  On exam I could not identify any areas of tenderness and would just simply follow this at this point.  The alcoholism question and his overall question I believe would be best handled by mental health providers which we will try to secure also via the [facility]    Total 60 minutes of which 80% was spent in counseling and coordination of care of the above plan.    Electronically signed by: Ortiz Nance MD

## 2021-06-13 NOTE — PROGRESS NOTES
Code Status:  FULL CODE  Visit Type: Problem Visit (acp)     Facility:  [facility] [[id number]]        Facility Type: SNF (Skilled Nursing Facility, TCU)    History of Present Illness: Josh Seth is a 67 y.o. male who I am seeing in follow-up as part of a case conference.  We were not able to get a hold of his primary care provider yesterday and we have now reached out again through the [facility] to try to secure a logical plan.  Patient has agreed to stay somewhat longer to try to maximize what therapeutic interventions we can do with his cognition and in hopes that his antidepressant may sharpen his mentation.    Review of Systems     Physical Exam   Constitutional:   Alert interactive some circular reasoning independent walking with little deficit.   Vitals reviewed.      Labs:  All labs reviewed in the nursing home record.    Assessment:  1. Mild cognitive disorder     2. Altered mental status     3. Depression         Plan: At this juncture we will continue with therapy my hope is to connect with his primary care provider and alerted them of our concerns as we are not going to be able get insurance coverage through to his next cognitive eval.  At this juncture we will go.  He had with therapy as he is willing to go forward at this timeb      15 minutes spent of which greater than 65% was face to face communication with the patient about above plan of care    Electronically signed by: Ortiz Nance MD

## 2021-06-13 NOTE — PROGRESS NOTES
Code Status:  FULL CODE  Visit Type: Discharge Summary     Facility:  [facility] [[id number]]          PCP:  No Primary Care Provider  [phone]       Admission Date to our Facility: September 2, 2017 [facility] admission and admission to September 12, 2017  Discharge Date from our Facility: September 25, 2017    Discharge Diagnosis:    1. Mild cognitive disorder     2. Altered mental status     3. Depression     4. Alcohol use disorder     5. Acute pancreatitis     6. Weight loss     7. Emphysema (subcutaneous) (surgical) resulting from a procedure          History of Present Illness: Josh Seth is a 67 y.o. male     Skilled Nursing Facility Course: Patient initially presented to the emergency department with weakness fatigue in the background of recent increase in alcohol use stress and significant confusion.  He additionally had lost 40-60 pounds over the previous year.  He was a's and continues to be a smoker.  Family had witnessed him having some disorganized thoughts and for this reason asked him to go to the hospital.  There they found cognitive deficits including a [place] cognitive assessment which was 18/30.  They tried all the lab work and it was all essentially normal.  However they did find a pancreatitis and mild atrophy on head CT.    Facility course patient quickly became ambulatory at good gait speed and independent in all activities.  However his cognitive testing continue to be slightly abnormal.  He was quite conversant and communicative but his CPT score was a 4.1/5.6 with some persistent deficits.  Additionally he only scored 24/30 on the SLUMS test he showed deficits in spatial awareness with a clock test and was quite poor at any type of recall.  We discussed and worked on the complexity of his job with monitoring and the extensive hours he was working.  Unfortunately the neurocognitive battery of tests that we ordered he did decline to go to the appointment.  On 18  September we did start him on 10 mg of Celexa and he does feel he is a little bit more clear.  His weights and blood pressure remained stable throughout visitation with no significant change    Discharge Medications: Amlodipine 10 mg daily, mirtazapine 7.5 mg at at bedtime, vitamin B1 100 daily protein supplements 4 ounces twice daily, Celexa 10 mg daily      For most current and accurate medication list, please contact the [facility] nursing facility that this patient visit took place at.      Discharge Plan: Offered OT and PT the patient has declined.  We would very much like him to get cognitive testing as we have some concerns about safety with both complex problems as in his job and in difficulties with driving and spatial awareness and perhaps getting lost.  We have reached out to primary care and have not heard back yet to make them aware of the need to follow-up on these issues.    Review of Systems     Physical Exam    Labs:  All labs reviewed in the nursing home record.    Assessment:  1. Mild cognitive disorder     2. Altered mental status     3. Depression     4. Alcohol use disorder     5. Acute pancreatitis     6. Weight loss     7. Emphysema (subcutaneous) (surgical) resulting from a procedure         MEDICAL EQUIPMENT NEEDS:  None      DISCHARGE PLAN/FACE TO FACE:  I certify that services are/were furnished while this patient was under the care of a physician and that a physician or an allowed non-physician practitioner (NPP), had a face-to-face encounter that meets the physician face-to-face encounter requirements. The encounter was in whole, or in part, related to the primary reason for home health. The patient is confined to his/her home and needs intermittent skilled nursing, physical therapy, speech-language pathology, or the continued need for occupational therapy. A plan of care has been established by a physician and is periodically reviewed by a physician.    I certify that this patient is  under my care and that I, or a nurse practitioner or physician's assistant working with me, had a face-to-face encounter that meets the physician face-to-face encounter requirements with this patient.   Date of Face-to-Face Encounter: September 25, 2017    I certify that, based on my findings, the following services are medically necessary home health services: Patient is independent at this juncture will not be needing the services.            The patient is, or has been, under my care and I have initiated the establishment of the plan of care. This patient will be followed by a physician who will periodically review the plan of care.  We would very much like him to establish with a primary care person and more importantly go through neurocognitive testing.  He is going to change his work schedule around I am not certain that we will do it altogether.  He has some insight into the drinking although there is a considerable amount of denial and is not willing to accept follow-up.  He is at odds with the [facility] right now so services need to be both nuanced and through his regular primary care non- doctor    45 minutes total time of which 65% was in face to face communication with patient about above plan of care.    Electronically signed by: Ortiz Nance MD

## 2021-06-13 NOTE — PROGRESS NOTES
Code Status:  FULL CODE  Visit Type: Problem Visit (Poor memory, history of confusion)     Facility:  [facility] [[id number]]        Facility Type: SNF (Skilled Nursing Facility, TCU)    History of Present Illness: Josh Seth is a 67 y.o. male who I am seeing back in follow-up to go over recent events.  He admitted to hospital with difficulties with confusion and our workup to date has shown significant domain difficulties with clock drawing memory recall which puts him in the mild to moderate dementia category and at risk for driving.    Review of Systems     Physical Exam   Constitutional:   She does not angry presents very appropriate and we have ongoing discussions.  He did allow us to begin antidepressant therapy.  No significant changes in his vital signs and/or mentation/presentation   Vitals reviewed.      Labs:  All labs reviewed in the nursing home record.    Assessment:  1. Mild cognitive disorder     2. Alcohol use disorder     3. Depression     4. Tobacco dependence         Plan: Next intervention of extreme benefit would be very full neurocognitive testing which will elucidate more subtleties in his mentation difficulties and give us more information to better make long-term recommendations for important things such as disability, senior living, driving safety.  He unfortunately declined to go to the meeting this morning that was scheduled for him for the full neurocognitive testing.  After visiting with him in understanding the importance of so doing he is now somewhat more willing to undergo this.  He is angry at the [facility] for various issues.  At this juncture I have asked the team to call me and we will hopes to put something together.  He has a care conference tomorrow and I know I can hold them for many days as she is very anxious to return to his life and in certain aspects very functional.  I will await their call and try to come up with the plan that will give us better insight into his  true cognitive problems and we will get assistance in promoting a plan particularly through the [facility] that will benefit him      25 minutes spent of which greater than 65% was face to face communication with the patient about above plan of care    Electronically signed by: Ortiz Nance MD

## 2021-06-13 NOTE — PROGRESS NOTES
Code Status:  FULL CODE  Visit Type: Problem Visit (Cognitive decline,)     Facility:  [facility] [[id number]]        Facility Type: SNF (Skilled Nursing Facility, TCU)    History of Present Illness: Josh Seth is a 66 y.o. male who I am seeing back after physical and occupational therapy have done multiple visitations.  They are continuing to note some cognitive difficulties with spatial awareness inability to do mapping scoring on the CT PET 4.1/5.6.  Further having some difficulties with making decisions.  They have fears of his ability to drive her to make judgments at around the car.  He feels these are all misunderstandings but he is not angry about the above.  He is quite keen on getting the paperwork that he is submitted to me addressed.    Review of Systems     Physical Exam   Constitutional:   Alert interactive with me and at times funny loquacious.  Does not show any obvious signs of disorientation but as things get more complicated this appears to be the problem.   Vitals reviewed.      Labs:  All labs reviewed in the nursing home record.    Assessment:  1. Mild cognitive disorder     2. Alcohol use disorder         Plan: As the decisions that were going forward on have deep impact on his life and future life I have asked that we [facility] a [facility] to see if they could best define this for us prior to making any long-term judgments that will affect his livelihood his current living situation etc.  I will look at his paperwork and do the best I can if not filling it out or signing this to his regular providers.  Filled out his paperwork to the best of my ability although most of the questions there is uncertain answer and will clarify after neurocognitive testing.  Patient approaches me later and reflects on his deep sadness that he may not be able to drive in the future.  He discussed that he was depressed.  We then visited on the issue of whether he would be  willing to take medicine and he initially thought not but I had recommended a trial to see if this made him more functional and stronger.  He agreed and for this reason I started him on Celexa 10 mg daily and we will evaluate down the line.  I did fill out his paperwork.      25 minutes spent of which greater than 65% was face to face communication with the patient about above plan of care    Electronically signed by: Ortiz Nance MD

## 2024-06-17 PROBLEM — Z71.89 ADVANCED DIRECTIVES, COUNSELING/DISCUSSION: Status: RESOLVED | Noted: 2017-02-07 | Resolved: 2024-06-17
